# Patient Record
Sex: FEMALE | Race: WHITE | NOT HISPANIC OR LATINO
[De-identification: names, ages, dates, MRNs, and addresses within clinical notes are randomized per-mention and may not be internally consistent; named-entity substitution may affect disease eponyms.]

---

## 2018-01-09 ENCOUNTER — TRANSCRIPTION ENCOUNTER (OUTPATIENT)
Age: 73
End: 2018-01-09

## 2018-03-01 ENCOUNTER — TRANSCRIPTION ENCOUNTER (OUTPATIENT)
Age: 73
End: 2018-03-01

## 2018-10-25 ENCOUNTER — APPOINTMENT (OUTPATIENT)
Dept: BREAST CENTER | Facility: CLINIC | Age: 73
End: 2018-10-25
Payer: MEDICARE

## 2018-10-25 VITALS
DIASTOLIC BLOOD PRESSURE: 78 MMHG | HEART RATE: 96 BPM | BODY MASS INDEX: 22.5 KG/M2 | WEIGHT: 127 LBS | SYSTOLIC BLOOD PRESSURE: 158 MMHG | HEIGHT: 63 IN

## 2018-10-25 DIAGNOSIS — Z80.7 FAMILY HISTORY OF OTHER MALIGNANT NEOPLASMS OF LYMPHOID, HEMATOPOIETIC AND RELATED TISSUES: ICD-10-CM

## 2018-10-25 DIAGNOSIS — Z86.39 PERSONAL HISTORY OF OTHER ENDOCRINE, NUTRITIONAL AND METABOLIC DISEASE: ICD-10-CM

## 2018-10-25 DIAGNOSIS — Z78.9 OTHER SPECIFIED HEALTH STATUS: ICD-10-CM

## 2018-10-25 DIAGNOSIS — Z82.49 FAMILY HISTORY OF ISCHEMIC HEART DISEASE AND OTHER DISEASES OF THE CIRCULATORY SYSTEM: ICD-10-CM

## 2018-10-25 PROCEDURE — 99214 OFFICE O/P EST MOD 30 MIN: CPT

## 2018-10-25 RX ORDER — GINGER ROOT/GINGER ROOT EXT 262.5 MG
CAPSULE ORAL
Refills: 0 | Status: ACTIVE | COMMUNITY

## 2018-10-26 ENCOUNTER — OUTPATIENT (OUTPATIENT)
Dept: OUTPATIENT SERVICES | Facility: HOSPITAL | Age: 73
LOS: 1 days | End: 2018-10-26

## 2018-10-26 ENCOUNTER — APPOINTMENT (OUTPATIENT)
Dept: OPHTHALMOLOGY | Facility: CLINIC | Age: 73
End: 2018-10-26

## 2018-10-26 DIAGNOSIS — H26.9 UNSPECIFIED CATARACT: ICD-10-CM

## 2018-11-01 ENCOUNTER — OUTPATIENT (OUTPATIENT)
Dept: OUTPATIENT SERVICES | Facility: HOSPITAL | Age: 73
LOS: 1 days | Discharge: HOME | End: 2018-11-01

## 2018-11-15 ENCOUNTER — OUTPATIENT (OUTPATIENT)
Dept: OUTPATIENT SERVICES | Facility: HOSPITAL | Age: 73
LOS: 1 days | Discharge: HOME | End: 2018-11-15

## 2019-04-08 ENCOUNTER — TRANSCRIPTION ENCOUNTER (OUTPATIENT)
Age: 74
End: 2019-04-08

## 2019-05-02 ENCOUNTER — APPOINTMENT (OUTPATIENT)
Dept: BREAST CENTER | Facility: CLINIC | Age: 74
End: 2019-05-02
Payer: MEDICARE

## 2019-05-02 VITALS
SYSTOLIC BLOOD PRESSURE: 144 MMHG | HEART RATE: 92 BPM | BODY MASS INDEX: 23.19 KG/M2 | DIASTOLIC BLOOD PRESSURE: 71 MMHG | HEIGHT: 62 IN | WEIGHT: 126 LBS

## 2019-05-02 DIAGNOSIS — Z86.79 PERSONAL HISTORY OF OTHER DISEASES OF THE CIRCULATORY SYSTEM: ICD-10-CM

## 2019-05-02 PROCEDURE — 99214 OFFICE O/P EST MOD 30 MIN: CPT

## 2019-05-02 NOTE — HISTORY OF PRESENT ILLNESS
[FreeTextEntry1] : S/P R Sono Core Bx ( R 10:00 N4)(10/30/17): +complex FA\par +FH Br Ca ( Mother 52, M. Aunts 51, 83, M. FC 58 (BRCA-))\par BRCA (9/07)([facility]): -\par S/P R Br Bx (5/90, [place]): Benign\par S/P Cataracts OU (11/18) > went well\par No other MH/FH changes. ROS reviewed/discussed. Taking Vit D. Last Bone Densitometry (2018): +osteopenia\par R F/U Sono (4/9/18): NSF\par Mammo/Sono (10/24/18): NSF

## 2019-05-02 NOTE — PHYSICAL EXAM
[Atraumatic] : atraumatic [Normocephalic] : normocephalic [No Supraclavicular Adenopathy] : no supraclavicular adenopathy [Supple] : supple [No Cervical Adenopathy] : no cervical adenopathy [No Thyromegaly] : no thyromegaly [Normal Sinus Rhythm] : normal sinus rhythm [No dominant masses] : no dominant masses in right breast  [No dominant masses] : no dominant masses left breast [Examined in the supine and seated position] : examined in the supine and seated position [No Nipple Retraction] : no right nipple retraction [No Axillary Lymphadenopathy] : no right axillary lymphadenopathy [No Nipple Discharge] : no left nipple discharge [No Edema] : no edema [No Rashes] : no rashes [No Ulceration] : no ulceration

## 2019-05-17 ENCOUNTER — TRANSCRIPTION ENCOUNTER (OUTPATIENT)
Age: 74
End: 2019-05-17

## 2020-03-05 ENCOUNTER — APPOINTMENT (OUTPATIENT)
Dept: BREAST CENTER | Facility: CLINIC | Age: 75
End: 2020-03-05
Payer: MEDICARE

## 2020-03-05 VITALS
BODY MASS INDEX: 22.63 KG/M2 | WEIGHT: 123 LBS | SYSTOLIC BLOOD PRESSURE: 155 MMHG | HEART RATE: 98 BPM | DIASTOLIC BLOOD PRESSURE: 77 MMHG | HEIGHT: 62 IN

## 2020-03-05 DIAGNOSIS — Z80.0 FAMILY HISTORY OF MALIGNANT NEOPLASM OF DIGESTIVE ORGANS: ICD-10-CM

## 2020-03-05 PROCEDURE — 99214 OFFICE O/P EST MOD 30 MIN: CPT

## 2020-03-05 NOTE — HISTORY OF PRESENT ILLNESS
[FreeTextEntry1] : S/P R Sono Core Bx ( R 10:00 N4)(10/30/17): +complex FA\par +FH Br Ca ( Mother 52, M. Aunts 51, 83, M. 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\par No other MH/FH changes. ROS reviewed/discussed. Taking Vit D. Last Bone Densitometry (2018): +osteopenia\par Mammo/Sono (10/29/19): PEG, NSF

## 2020-11-14 ENCOUNTER — TRANSCRIPTION ENCOUNTER (OUTPATIENT)
Age: 75
End: 2020-11-14

## 2020-11-19 ENCOUNTER — APPOINTMENT (OUTPATIENT)
Dept: BREAST CENTER | Facility: CLINIC | Age: 75
End: 2020-11-19
Payer: MEDICARE

## 2020-11-19 VITALS
HEART RATE: 93 BPM | DIASTOLIC BLOOD PRESSURE: 85 MMHG | HEIGHT: 62 IN | BODY MASS INDEX: 23 KG/M2 | WEIGHT: 125 LBS | SYSTOLIC BLOOD PRESSURE: 155 MMHG

## 2020-11-19 PROCEDURE — 99214 OFFICE O/P EST MOD 30 MIN: CPT

## 2020-11-19 NOTE — HISTORY OF PRESENT ILLNESS
[FreeTextEntry1] : S/P R Sono Core Bx ( R 10:00 N4)(10/30/17): +complex FA\par +FH Br Ca ( Mother 52, M. Aunts 51, 83, M. FC 58 (BRCA-))\par BRCA ()([facility]): -\par S/P R Br Bx (, [place]): Benign\par S/P Cataracts OU () > went well\par  w/ met Gastric Ca and BRCA1+ >  ()(76yo)\par Sons being tested\par No other MH/FH changes. ROS reviewed/discussed. Taking Vit D. Last Bone Densitometry (): +osteopenia\par Mammo/Sono (20): HD, NSF

## 2020-11-19 NOTE — PHYSICAL EXAM
[Normocephalic] : normocephalic [Atraumatic] : atraumatic [Supple] : supple [No Supraclavicular Adenopathy] : no supraclavicular adenopathy [No Cervical Adenopathy] : no cervical adenopathy [No Thyromegaly] : no thyromegaly [Normal Sinus Rhythm] : normal sinus rhythm [Examined in the supine and seated position] : examined in the supine and seated position [No dominant masses] : no dominant masses in right breast  [No dominant masses] : no dominant masses left breast [No Nipple Retraction] : no left nipple retraction [No Nipple Discharge] : no left nipple discharge [No Axillary Lymphadenopathy] : no left axillary lymphadenopathy [No Edema] : no edema [No Rashes] : no rashes [No Ulceration] : no ulceration [de-identified] : +2-3mm nod R 10:00 N6 > observe

## 2021-05-27 ENCOUNTER — APPOINTMENT (OUTPATIENT)
Dept: BREAST CENTER | Facility: CLINIC | Age: 76
End: 2021-05-27
Payer: MEDICARE

## 2021-05-27 ENCOUNTER — NON-APPOINTMENT (OUTPATIENT)
Age: 76
End: 2021-05-27

## 2021-05-27 VITALS
WEIGHT: 128 LBS | DIASTOLIC BLOOD PRESSURE: 75 MMHG | SYSTOLIC BLOOD PRESSURE: 125 MMHG | HEIGHT: 62 IN | BODY MASS INDEX: 23.55 KG/M2 | HEART RATE: 115 BPM

## 2021-05-27 DIAGNOSIS — Z12.31 ENCOUNTER FOR SCREENING MAMMOGRAM FOR MALIGNANT NEOPLASM OF BREAST: ICD-10-CM

## 2021-05-27 PROCEDURE — 99214 OFFICE O/P EST MOD 30 MIN: CPT

## 2021-05-27 NOTE — HISTORY OF PRESENT ILLNESS
[FreeTextEntry1] : S/P R Sono Core Bx ( R 10:00 N4)(10/30/17): +complex FA\par +FH Br Ca ( Mother 52, M. Aunts 51, 83, M. FC 58 (BRCA-))\par BRCA ()([facility]): -\par S/P R Br Bx (, [place]): Benign\par S/P Cataracts OU () > went well\par  w/ met Gastric Ca and BRCA1+ >  ()(76yo)\par Younger BRCA tested > "-"\par Got second Pfizer (3/1/21)(LUE)\par No other MH/FH changes. ROS reviewed/discussed. Taking Vit D. Last Bone Densitometry (): +osteopenia\par Mammo/Sono (20): HD, NSF

## 2021-12-08 ENCOUNTER — APPOINTMENT (OUTPATIENT)
Dept: BREAST CENTER | Facility: CLINIC | Age: 76
End: 2021-12-08
Payer: MEDICARE

## 2021-12-08 VITALS
DIASTOLIC BLOOD PRESSURE: 77 MMHG | HEIGHT: 62 IN | WEIGHT: 129 LBS | BODY MASS INDEX: 23.74 KG/M2 | SYSTOLIC BLOOD PRESSURE: 148 MMHG

## 2021-12-08 PROCEDURE — 99213 OFFICE O/P EST LOW 20 MIN: CPT

## 2021-12-08 NOTE — HISTORY OF PRESENT ILLNESS
[FreeTextEntry1] : S/P R Sono Core Bx ( R 10:00 N4)(10/30/17): +complex FA\par +FH Br Ca ( Mother 52, M. Aunts 51, 83, M. FC 58 (BRCA-))\par BRCA ()(Multi Site): -\par S/P R Br Bx (, [place]): Benign\par S/P Cataracts OU () > went well\par  w/ met Gastric Ca and BRCA1+ >  ()(76yo)\par One Son is BRCA1+, one is BRCA1-\par Got Pfizer booster (21)(LUE)\par No other MH/FH changes. ROS reviewed/discussed. Taking Vit D. Last Bone Densitometry (): +osteopenia\par Mammo/Sono (21): FG, NSF

## 2022-04-04 ENCOUNTER — TRANSCRIPTION ENCOUNTER (OUTPATIENT)
Age: 77
End: 2022-04-04

## 2022-06-09 ENCOUNTER — APPOINTMENT (OUTPATIENT)
Dept: BREAST CENTER | Facility: CLINIC | Age: 77
End: 2022-06-09
Payer: MEDICARE

## 2022-06-09 VITALS
BODY MASS INDEX: 23.37 KG/M2 | HEIGHT: 62 IN | HEART RATE: 87 BPM | DIASTOLIC BLOOD PRESSURE: 64 MMHG | WEIGHT: 127 LBS | SYSTOLIC BLOOD PRESSURE: 124 MMHG

## 2022-06-09 PROCEDURE — 99213 OFFICE O/P EST LOW 20 MIN: CPT

## 2022-06-09 NOTE — HISTORY OF PRESENT ILLNESS
[FreeTextEntry1] : S/P R Sono Core Bx ( R 10:00 N4)(10/30/17): +complex FA\par +FH Br Ca ( Mother 52, M. Aunts 51, 83, M. FC 58 (BRCA-))\par BRCA ()(Multi Site): -\par S/P R Br Bx (, [place]): Benign\par S/P Cataracts OU () > went well\par  w/ met Gastric Ca and BRCA1+ >  ()(74yo)\par One Son is BRCA1+, one is BRCA1-\par Got second Pfizer booster ()(LUE)\par No other MH/FH changes. ROS reviewed/discussed. Taking Vit D. Last Bone Densitometry (): +osteopenia\par Mammo/Sono (21): FG, NSF

## 2022-06-09 NOTE — PHYSICAL EXAM
[Normocephalic] : normocephalic [Atraumatic] : atraumatic [Supple] : supple [No Supraclavicular Adenopathy] : no supraclavicular adenopathy [No Cervical Adenopathy] : no cervical adenopathy [No Thyromegaly] : no thyromegaly [Normal Sinus Rhythm] : normal sinus rhythm [Examined in the supine and seated position] : examined in the supine and seated position [No dominant masses] : no dominant masses in right breast  [No dominant masses] : no dominant masses left breast [No Nipple Retraction] : no left nipple retraction [No Nipple Discharge] : no left nipple discharge [No Axillary Lymphadenopathy] : no left axillary lymphadenopathy [No Edema] : no edema [No Rashes] : no rashes [No Ulceration] : no ulceration [de-identified] : +focal nod th R 5:00 N4 > observe

## 2022-07-25 ENCOUNTER — NON-APPOINTMENT (OUTPATIENT)
Age: 77
End: 2022-07-25

## 2022-08-27 NOTE — REVIEW OF SYSTEMS
Care Management Follow Up    Length of Stay (days): 2    Expected Discharge Date: 08/28/2022     Concerns to be Addressed: all concerns addressed in this encounter     Patient plan of care discussed at interdisciplinary rounds: Yes    Anticipated Discharge Disposition: Home, Home Care     Anticipated Discharge Services: None  Anticipated Discharge DME: None    Referrals Placed by CM/SW: External Care Coordination, Homecare  Private pay costs discussed: Not applicable    Additional Information:  Care management following for discharge planning. Recommendations for home with home care.   Spoke with nursing at [facility], [phone], to verify services. Patient was only receiving safety checks. Patient was independent.     Anticipate return to [facility] with home care RN/PT/OT through [facility]. P: [phone] Fax [phone]    Update 1430: Spoke with patient at bedside to review recommendations. Patient agreeable to returning to [facility] with Home Care. Patient states will not require assistance upon discharge. Patient was SBA/CGA with bed mobility, transfers, and ambulated 200 feet. Will clarify recommendations for assistance with therapy with next planned session.  Patient states will not have ride availability until Monday as family is out of town. Care management will discuss transportation options with patient further once discharge ready.     Update 1520: Per provider discussion, TCU is preferred as patient does not have assistance with mobility at [facility].  Referral sent to [facility].       Sussy Samuel, RN      Sussy Samuel RN Case Manager  Inpatient Care Coordination  [facility]   [phone]   [Negative] : Heme/Lymph

## 2022-12-21 ENCOUNTER — APPOINTMENT (OUTPATIENT)
Dept: BREAST CENTER | Facility: CLINIC | Age: 77
End: 2022-12-21

## 2022-12-21 VITALS
HEART RATE: 79 BPM | BODY MASS INDEX: 23.37 KG/M2 | SYSTOLIC BLOOD PRESSURE: 138 MMHG | WEIGHT: 127 LBS | HEIGHT: 62 IN | DIASTOLIC BLOOD PRESSURE: 80 MMHG

## 2022-12-21 PROCEDURE — 99214 OFFICE O/P EST MOD 30 MIN: CPT

## 2022-12-21 RX ORDER — ESOMEPRAZOLE MAGNESIUM 20 MG/1
TABLET, DELAYED RELEASE ORAL
Refills: 0 | Status: ACTIVE | COMMUNITY

## 2022-12-21 RX ORDER — ADHESIVE TAPE 3"X 2.3 YD
50 MCG TAPE, NON-MEDICATED TOPICAL
Refills: 0 | Status: ACTIVE | COMMUNITY

## 2022-12-21 RX ORDER — ATORVASTATIN CALCIUM 20 MG/1
20 TABLET, FILM COATED ORAL
Refills: 0 | Status: ACTIVE | COMMUNITY

## 2022-12-21 RX ORDER — AMLODIPINE BESYLATE 5 MG/1
5 TABLET ORAL
Refills: 0 | Status: ACTIVE | COMMUNITY

## 2022-12-21 RX ORDER — METOPROLOL TARTRATE 25 MG/1
25 TABLET, FILM COATED ORAL
Refills: 0 | Status: ACTIVE | COMMUNITY

## 2022-12-21 RX ORDER — LOSARTAN POTASSIUM 100 MG/1
100 TABLET, FILM COATED ORAL
Refills: 0 | Status: ACTIVE | COMMUNITY

## 2022-12-21 NOTE — PHYSICAL EXAM
[Normocephalic] : normocephalic [Atraumatic] : atraumatic [Supple] : supple [No Supraclavicular Adenopathy] : no supraclavicular adenopathy [No Cervical Adenopathy] : no cervical adenopathy [Normal Sinus Rhythm] : normal sinus rhythm [Examined in the supine and seated position] : examined in the supine and seated position [No dominant masses] : no dominant masses in right breast  [No dominant masses] : no dominant masses left breast [No Nipple Retraction] : no left nipple retraction [No Nipple Discharge] : no left nipple discharge [No Axillary Lymphadenopathy] : no left axillary lymphadenopathy [No Edema] : no edema [No Rashes] : no rashes [No Ulceration] : no ulceration [de-identified] : +mild thyromegaly [de-identified] : +dense FC tissue\par NSF  [de-identified] : +dense FC tissue\par NSF

## 2022-12-21 NOTE — HISTORY OF PRESENT ILLNESS
[FreeTextEntry1] : S/P R Sono Core Bx ( R 10:00 N4)(10/30/17): +complex FA\par +FH Br Ca ( Mother 52, M. Aunts 51, 83, M. FC 58 (BRCA-))\par BRCA ()(Multi Site): -\par S/P R Br Bx (, [place]): Benign\par S/P Cataracts OU () > went well\par  w/ met Gastric Ca and BRCA1+ >  ()(76yo)\par One Son is BRCA1+, one is BRCA1-\par Pt seeing Endocr for poss hyperthyroid and nodules > S/P Thyroid FNA (10/22): "Benign"\par Seeing Cardiol for AR/SD > HT Rx adjusted > followed\par Got second Pfizer booster ()(LUE)\par No other MH/FH changes. ROS reviewed/discussed. Taking Vit D. Last Bone Densitometry (): +osteopenia\par Mammo/Sono (22): FG, NSF

## 2023-06-07 ENCOUNTER — APPOINTMENT (OUTPATIENT)
Dept: BREAST CENTER | Facility: CLINIC | Age: 78
End: 2023-06-07
Payer: MEDICARE

## 2023-06-07 VITALS
WEIGHT: 125 LBS | SYSTOLIC BLOOD PRESSURE: 146 MMHG | HEART RATE: 78 BPM | DIASTOLIC BLOOD PRESSURE: 73 MMHG | HEIGHT: 62 IN | BODY MASS INDEX: 23 KG/M2

## 2023-06-07 PROCEDURE — 99214 OFFICE O/P EST MOD 30 MIN: CPT

## 2023-06-07 NOTE — HISTORY OF PRESENT ILLNESS
[FreeTextEntry1] : S/P R Sono Core Bx ( R 10:00 N4)(10/30/17): +complex FA\par +FH Br Ca ( Mother 52, M. Aunts 51, 83, M. FC 58 (BRCA-))\par BRCA ()(Multi Site): -\par S/P R Br Bx (, [place]): Benign\par S/P DEVORA/BSO (): Benign\par S/P Cataracts OU () > went well\par  w/ met Gastric Ca and BRCA1+ >  ()(76yo)\par One Son is BRCA1+, one is BRCA1-\par Pt seeing Endocr for poss hyperthyroid and nodules > S/P Thyroid FNA (10/22): "Benign"\par Seeing Cardiol for AR/NV > HT Rx adjusted > followed\par Colonoscopy(): "+polyp" > 5yrs \par Got second Pfizer booster ()([place])\par No other MH/FH changes. ROS reviewed/discussed. Taking Vit D. Last Bone Densitometry (): +osteopenia\par Mammo/Sono (22): FG, NSF

## 2023-06-07 NOTE — REVIEW OF SYSTEMS
[Chest Pain] : chest pain [Diarrhea] : diarrhea [Heartburn] : heartburn [Arthralgias] : arthralgias [Negative] : Heme/Lymph

## 2023-06-07 NOTE — PHYSICAL EXAM
[Normocephalic] : normocephalic [Atraumatic] : atraumatic [Supple] : supple [No Supraclavicular Adenopathy] : no supraclavicular adenopathy [No Cervical Adenopathy] : no cervical adenopathy [Normal Sinus Rhythm] : normal sinus rhythm [Examined in the supine and seated position] : examined in the supine and seated position [No dominant masses] : no dominant masses in right breast  [No dominant masses] : no dominant masses left breast [No Nipple Retraction] : no left nipple retraction [No Nipple Discharge] : no left nipple discharge [No Axillary Lymphadenopathy] : no left axillary lymphadenopathy [No Edema] : no edema [No Rashes] : no rashes [No Ulceration] : no ulceration [de-identified] : +thyromegaly > followed [de-identified] : +dense FC tissue\par NSF  [de-identified] : +dense FC tissue\par NSF

## 2023-12-20 ENCOUNTER — INPATIENT (INPATIENT)
Facility: HOSPITAL | Age: 78
LOS: 0 days | Discharge: HOME | DRG: 305 | End: 2023-12-21
Attending: STUDENT IN AN ORGANIZED HEALTH CARE EDUCATION/TRAINING PROGRAM | Admitting: STUDENT IN AN ORGANIZED HEALTH CARE EDUCATION/TRAINING PROGRAM
Payer: COMMERCIAL

## 2023-12-20 VITALS
OXYGEN SATURATION: 99 % | RESPIRATION RATE: 19 BRPM | TEMPERATURE: 98 F | DIASTOLIC BLOOD PRESSURE: 89 MMHG | HEIGHT: 62.6 IN | HEART RATE: 80 BPM | WEIGHT: 125.66 LBS | SYSTOLIC BLOOD PRESSURE: 156 MMHG

## 2023-12-20 DIAGNOSIS — I16.1 HYPERTENSIVE EMERGENCY: ICD-10-CM

## 2023-12-20 DIAGNOSIS — I24.9 ACUTE ISCHEMIC HEART DISEASE, UNSPECIFIED: ICD-10-CM

## 2023-12-20 DIAGNOSIS — E05.90 THYROTOXICOSIS, UNSPECIFIED WITHOUT THYROTOXIC CRISIS OR STORM: ICD-10-CM

## 2023-12-20 DIAGNOSIS — E78.5 HYPERLIPIDEMIA, UNSPECIFIED: ICD-10-CM

## 2023-12-20 DIAGNOSIS — Z29.9 ENCOUNTER FOR PROPHYLACTIC MEASURES, UNSPECIFIED: ICD-10-CM

## 2023-12-20 DIAGNOSIS — I10 ESSENTIAL (PRIMARY) HYPERTENSION: ICD-10-CM

## 2023-12-20 DIAGNOSIS — R07.89 OTHER CHEST PAIN: ICD-10-CM

## 2023-12-20 LAB
A1C WITH ESTIMATED AVERAGE GLUCOSE RESULT: 5.8 % — HIGH (ref 4–5.6)
A1C WITH ESTIMATED AVERAGE GLUCOSE RESULT: 5.8 % — HIGH (ref 4–5.6)
ALBUMIN SERPL ELPH-MCNC: 3.6 G/DL — SIGNIFICANT CHANGE UP (ref 3.5–5)
ALBUMIN SERPL ELPH-MCNC: 3.6 G/DL — SIGNIFICANT CHANGE UP (ref 3.5–5)
ALP SERPL-CCNC: 67 U/L — SIGNIFICANT CHANGE UP (ref 40–120)
ALP SERPL-CCNC: 67 U/L — SIGNIFICANT CHANGE UP (ref 40–120)
ALT FLD-CCNC: 24 U/L DA — SIGNIFICANT CHANGE UP (ref 10–60)
ALT FLD-CCNC: 24 U/L DA — SIGNIFICANT CHANGE UP (ref 10–60)
ANION GAP SERPL CALC-SCNC: 6 MMOL/L — SIGNIFICANT CHANGE UP (ref 5–17)
ANION GAP SERPL CALC-SCNC: 6 MMOL/L — SIGNIFICANT CHANGE UP (ref 5–17)
APTT BLD: 27.9 SEC — SIGNIFICANT CHANGE UP (ref 24.5–35.6)
APTT BLD: 27.9 SEC — SIGNIFICANT CHANGE UP (ref 24.5–35.6)
AST SERPL-CCNC: 17 U/L — SIGNIFICANT CHANGE UP (ref 10–40)
AST SERPL-CCNC: 17 U/L — SIGNIFICANT CHANGE UP (ref 10–40)
BASOPHILS # BLD AUTO: 0.03 K/UL — SIGNIFICANT CHANGE UP (ref 0–0.2)
BASOPHILS # BLD AUTO: 0.03 K/UL — SIGNIFICANT CHANGE UP (ref 0–0.2)
BASOPHILS NFR BLD AUTO: 0.5 % — SIGNIFICANT CHANGE UP (ref 0–2)
BASOPHILS NFR BLD AUTO: 0.5 % — SIGNIFICANT CHANGE UP (ref 0–2)
BILIRUB SERPL-MCNC: 0.7 MG/DL — SIGNIFICANT CHANGE UP (ref 0.2–1.2)
BILIRUB SERPL-MCNC: 0.7 MG/DL — SIGNIFICANT CHANGE UP (ref 0.2–1.2)
BUN SERPL-MCNC: 20 MG/DL — HIGH (ref 7–18)
BUN SERPL-MCNC: 20 MG/DL — HIGH (ref 7–18)
CALCIUM SERPL-MCNC: 9.2 MG/DL — SIGNIFICANT CHANGE UP (ref 8.4–10.5)
CALCIUM SERPL-MCNC: 9.2 MG/DL — SIGNIFICANT CHANGE UP (ref 8.4–10.5)
CHLORIDE SERPL-SCNC: 106 MMOL/L — SIGNIFICANT CHANGE UP (ref 96–108)
CHLORIDE SERPL-SCNC: 106 MMOL/L — SIGNIFICANT CHANGE UP (ref 96–108)
CHOLEST SERPL-MCNC: 157 MG/DL — SIGNIFICANT CHANGE UP
CHOLEST SERPL-MCNC: 157 MG/DL — SIGNIFICANT CHANGE UP
CO2 SERPL-SCNC: 28 MMOL/L — SIGNIFICANT CHANGE UP (ref 22–31)
CO2 SERPL-SCNC: 28 MMOL/L — SIGNIFICANT CHANGE UP (ref 22–31)
CREAT SERPL-MCNC: 0.98 MG/DL — SIGNIFICANT CHANGE UP (ref 0.5–1.3)
CREAT SERPL-MCNC: 0.98 MG/DL — SIGNIFICANT CHANGE UP (ref 0.5–1.3)
EGFR: 60 ML/MIN/1.73M2 — SIGNIFICANT CHANGE UP
EGFR: 60 ML/MIN/1.73M2 — SIGNIFICANT CHANGE UP
EOSINOPHIL # BLD AUTO: 0.09 K/UL — SIGNIFICANT CHANGE UP (ref 0–0.5)
EOSINOPHIL # BLD AUTO: 0.09 K/UL — SIGNIFICANT CHANGE UP (ref 0–0.5)
EOSINOPHIL NFR BLD AUTO: 1.5 % — SIGNIFICANT CHANGE UP (ref 0–6)
EOSINOPHIL NFR BLD AUTO: 1.5 % — SIGNIFICANT CHANGE UP (ref 0–6)
ESTIMATED AVERAGE GLUCOSE: 120 MG/DL — HIGH (ref 68–114)
ESTIMATED AVERAGE GLUCOSE: 120 MG/DL — HIGH (ref 68–114)
GLUCOSE SERPL-MCNC: 102 MG/DL — HIGH (ref 70–99)
GLUCOSE SERPL-MCNC: 102 MG/DL — HIGH (ref 70–99)
HCT VFR BLD CALC: 38.2 % — SIGNIFICANT CHANGE UP (ref 34.5–45)
HCT VFR BLD CALC: 38.2 % — SIGNIFICANT CHANGE UP (ref 34.5–45)
HDLC SERPL-MCNC: 77 MG/DL — SIGNIFICANT CHANGE UP
HDLC SERPL-MCNC: 77 MG/DL — SIGNIFICANT CHANGE UP
HGB BLD-MCNC: 12.1 G/DL — SIGNIFICANT CHANGE UP (ref 11.5–15.5)
HGB BLD-MCNC: 12.1 G/DL — SIGNIFICANT CHANGE UP (ref 11.5–15.5)
IMM GRANULOCYTES NFR BLD AUTO: 0.3 % — SIGNIFICANT CHANGE UP (ref 0–0.9)
IMM GRANULOCYTES NFR BLD AUTO: 0.3 % — SIGNIFICANT CHANGE UP (ref 0–0.9)
INR BLD: 1.01 RATIO — SIGNIFICANT CHANGE UP (ref 0.85–1.18)
INR BLD: 1.01 RATIO — SIGNIFICANT CHANGE UP (ref 0.85–1.18)
LIDOCAIN IGE QN: 97 U/L — HIGH (ref 13–75)
LIDOCAIN IGE QN: 97 U/L — HIGH (ref 13–75)
LIPID PNL WITH DIRECT LDL SERPL: 67 MG/DL — SIGNIFICANT CHANGE UP
LIPID PNL WITH DIRECT LDL SERPL: 67 MG/DL — SIGNIFICANT CHANGE UP
LYMPHOCYTES # BLD AUTO: 1.24 K/UL — SIGNIFICANT CHANGE UP (ref 1–3.3)
LYMPHOCYTES # BLD AUTO: 1.24 K/UL — SIGNIFICANT CHANGE UP (ref 1–3.3)
LYMPHOCYTES # BLD AUTO: 20.8 % — SIGNIFICANT CHANGE UP (ref 13–44)
LYMPHOCYTES # BLD AUTO: 20.8 % — SIGNIFICANT CHANGE UP (ref 13–44)
MAGNESIUM SERPL-MCNC: 2 MG/DL — SIGNIFICANT CHANGE UP (ref 1.6–2.6)
MAGNESIUM SERPL-MCNC: 2 MG/DL — SIGNIFICANT CHANGE UP (ref 1.6–2.6)
MCHC RBC-ENTMCNC: 27.8 PG — SIGNIFICANT CHANGE UP (ref 27–34)
MCHC RBC-ENTMCNC: 27.8 PG — SIGNIFICANT CHANGE UP (ref 27–34)
MCHC RBC-ENTMCNC: 31.7 GM/DL — LOW (ref 32–36)
MCHC RBC-ENTMCNC: 31.7 GM/DL — LOW (ref 32–36)
MCV RBC AUTO: 87.6 FL — SIGNIFICANT CHANGE UP (ref 80–100)
MCV RBC AUTO: 87.6 FL — SIGNIFICANT CHANGE UP (ref 80–100)
MONOCYTES # BLD AUTO: 0.55 K/UL — SIGNIFICANT CHANGE UP (ref 0–0.9)
MONOCYTES # BLD AUTO: 0.55 K/UL — SIGNIFICANT CHANGE UP (ref 0–0.9)
MONOCYTES NFR BLD AUTO: 9.2 % — SIGNIFICANT CHANGE UP (ref 2–14)
MONOCYTES NFR BLD AUTO: 9.2 % — SIGNIFICANT CHANGE UP (ref 2–14)
NEUTROPHILS # BLD AUTO: 4.03 K/UL — SIGNIFICANT CHANGE UP (ref 1.8–7.4)
NEUTROPHILS # BLD AUTO: 4.03 K/UL — SIGNIFICANT CHANGE UP (ref 1.8–7.4)
NEUTROPHILS NFR BLD AUTO: 67.7 % — SIGNIFICANT CHANGE UP (ref 43–77)
NEUTROPHILS NFR BLD AUTO: 67.7 % — SIGNIFICANT CHANGE UP (ref 43–77)
NON HDL CHOLESTEROL: 80 MG/DL — SIGNIFICANT CHANGE UP
NON HDL CHOLESTEROL: 80 MG/DL — SIGNIFICANT CHANGE UP
NRBC # BLD: 0 /100 WBCS — SIGNIFICANT CHANGE UP (ref 0–0)
NRBC # BLD: 0 /100 WBCS — SIGNIFICANT CHANGE UP (ref 0–0)
NT-PROBNP SERPL-SCNC: 77 PG/ML — SIGNIFICANT CHANGE UP (ref 0–450)
NT-PROBNP SERPL-SCNC: 77 PG/ML — SIGNIFICANT CHANGE UP (ref 0–450)
PLATELET # BLD AUTO: 295 K/UL — SIGNIFICANT CHANGE UP (ref 150–400)
PLATELET # BLD AUTO: 295 K/UL — SIGNIFICANT CHANGE UP (ref 150–400)
POTASSIUM SERPL-MCNC: 4.3 MMOL/L — SIGNIFICANT CHANGE UP (ref 3.5–5.3)
POTASSIUM SERPL-MCNC: 4.3 MMOL/L — SIGNIFICANT CHANGE UP (ref 3.5–5.3)
POTASSIUM SERPL-SCNC: 4.3 MMOL/L — SIGNIFICANT CHANGE UP (ref 3.5–5.3)
POTASSIUM SERPL-SCNC: 4.3 MMOL/L — SIGNIFICANT CHANGE UP (ref 3.5–5.3)
PROT SERPL-MCNC: 7.9 G/DL — SIGNIFICANT CHANGE UP (ref 6–8.3)
PROT SERPL-MCNC: 7.9 G/DL — SIGNIFICANT CHANGE UP (ref 6–8.3)
PROTHROM AB SERPL-ACNC: 11.5 SEC — SIGNIFICANT CHANGE UP (ref 9.5–13)
PROTHROM AB SERPL-ACNC: 11.5 SEC — SIGNIFICANT CHANGE UP (ref 9.5–13)
RBC # BLD: 4.36 M/UL — SIGNIFICANT CHANGE UP (ref 3.8–5.2)
RBC # BLD: 4.36 M/UL — SIGNIFICANT CHANGE UP (ref 3.8–5.2)
RBC # FLD: 12.9 % — SIGNIFICANT CHANGE UP (ref 10.3–14.5)
RBC # FLD: 12.9 % — SIGNIFICANT CHANGE UP (ref 10.3–14.5)
SODIUM SERPL-SCNC: 140 MMOL/L — SIGNIFICANT CHANGE UP (ref 135–145)
SODIUM SERPL-SCNC: 140 MMOL/L — SIGNIFICANT CHANGE UP (ref 135–145)
TRIGL SERPL-MCNC: 66 MG/DL — SIGNIFICANT CHANGE UP
TRIGL SERPL-MCNC: 66 MG/DL — SIGNIFICANT CHANGE UP
TROPONIN I, HIGH SENSITIVITY RESULT: 5.6 NG/L — SIGNIFICANT CHANGE UP
TROPONIN I, HIGH SENSITIVITY RESULT: 5.6 NG/L — SIGNIFICANT CHANGE UP
TROPONIN I, HIGH SENSITIVITY RESULT: 6.1 NG/L — SIGNIFICANT CHANGE UP
TROPONIN I, HIGH SENSITIVITY RESULT: 6.1 NG/L — SIGNIFICANT CHANGE UP
TSH SERPL-MCNC: 0.51 UU/ML — SIGNIFICANT CHANGE UP (ref 0.34–4.82)
TSH SERPL-MCNC: 0.51 UU/ML — SIGNIFICANT CHANGE UP (ref 0.34–4.82)
WBC # BLD: 5.96 K/UL — SIGNIFICANT CHANGE UP (ref 3.8–10.5)
WBC # BLD: 5.96 K/UL — SIGNIFICANT CHANGE UP (ref 3.8–10.5)
WBC # FLD AUTO: 5.96 K/UL — SIGNIFICANT CHANGE UP (ref 3.8–10.5)
WBC # FLD AUTO: 5.96 K/UL — SIGNIFICANT CHANGE UP (ref 3.8–10.5)

## 2023-12-20 PROCEDURE — 71045 X-RAY EXAM CHEST 1 VIEW: CPT | Mod: 26

## 2023-12-20 PROCEDURE — 99285 EMERGENCY DEPT VISIT HI MDM: CPT

## 2023-12-20 PROCEDURE — 99223 1ST HOSP IP/OBS HIGH 75: CPT | Mod: AI

## 2023-12-20 RX ORDER — ATORVASTATIN CALCIUM 80 MG/1
80 TABLET, FILM COATED ORAL AT BEDTIME
Refills: 0 | Status: DISCONTINUED | OUTPATIENT
Start: 2023-12-20 | End: 2023-12-21

## 2023-12-20 RX ORDER — ACETAMINOPHEN 500 MG
650 TABLET ORAL EVERY 6 HOURS
Refills: 0 | Status: DISCONTINUED | OUTPATIENT
Start: 2023-12-20 | End: 2023-12-21

## 2023-12-20 RX ORDER — ASPIRIN/CALCIUM CARB/MAGNESIUM 324 MG
81 TABLET ORAL DAILY
Refills: 0 | Status: DISCONTINUED | OUTPATIENT
Start: 2023-12-21 | End: 2023-12-21

## 2023-12-20 RX ORDER — ENOXAPARIN SODIUM 100 MG/ML
40 INJECTION SUBCUTANEOUS EVERY 24 HOURS
Refills: 0 | Status: DISCONTINUED | OUTPATIENT
Start: 2023-12-20 | End: 2023-12-21

## 2023-12-20 RX ORDER — METOPROLOL TARTRATE 50 MG
12.5 TABLET ORAL
Refills: 0 | Status: DISCONTINUED | OUTPATIENT
Start: 2023-12-20 | End: 2023-12-21

## 2023-12-20 RX ORDER — ATORVASTATIN CALCIUM 80 MG/1
1 TABLET, FILM COATED ORAL
Refills: 0 | DISCHARGE

## 2023-12-20 RX ORDER — LOSARTAN POTASSIUM 100 MG/1
100 TABLET, FILM COATED ORAL DAILY
Refills: 0 | Status: DISCONTINUED | OUTPATIENT
Start: 2023-12-20 | End: 2023-12-21

## 2023-12-20 RX ORDER — LOSARTAN POTASSIUM 100 MG/1
1 TABLET, FILM COATED ORAL
Refills: 0 | DISCHARGE

## 2023-12-20 RX ORDER — METOPROLOL TARTRATE 50 MG
1 TABLET ORAL
Refills: 0 | DISCHARGE

## 2023-12-20 RX ORDER — AMLODIPINE BESYLATE 2.5 MG/1
5 TABLET ORAL DAILY
Refills: 0 | Status: DISCONTINUED | OUTPATIENT
Start: 2023-12-20 | End: 2023-12-21

## 2023-12-20 RX ORDER — AMLODIPINE BESYLATE 2.5 MG/1
1 TABLET ORAL
Refills: 0 | DISCHARGE

## 2023-12-20 RX ORDER — IBANDRONATE SODIUM 150 MG/1
0 TABLET ORAL
Refills: 0 | DISCHARGE

## 2023-12-20 RX ORDER — ASPIRIN/CALCIUM CARB/MAGNESIUM 324 MG
324 TABLET ORAL ONCE
Refills: 0 | Status: COMPLETED | OUTPATIENT
Start: 2023-12-20 | End: 2023-12-20

## 2023-12-20 RX ADMIN — Medication 650 MILLIGRAM(S): at 18:41

## 2023-12-20 RX ADMIN — ATORVASTATIN CALCIUM 80 MILLIGRAM(S): 80 TABLET, FILM COATED ORAL at 22:47

## 2023-12-20 RX ADMIN — Medication 650 MILLIGRAM(S): at 19:18

## 2023-12-20 RX ADMIN — Medication 324 MILLIGRAM(S): at 12:15

## 2023-12-20 RX ADMIN — Medication 12.5 MILLIGRAM(S): at 17:29

## 2023-12-20 RX ADMIN — LOSARTAN POTASSIUM 100 MILLIGRAM(S): 100 TABLET, FILM COATED ORAL at 14:05

## 2023-12-20 RX ADMIN — ENOXAPARIN SODIUM 40 MILLIGRAM(S): 100 INJECTION SUBCUTANEOUS at 14:08

## 2023-12-20 RX ADMIN — AMLODIPINE BESYLATE 5 MILLIGRAM(S): 2.5 TABLET ORAL at 14:08

## 2023-12-20 NOTE — H&P ADULT - NSHPPHYSICALEXAM_GEN_ALL_CORE
VITALS:     GENERAL: NAD, lying in bed comfortably  HEAD:  Atraumatic, Normocephalic  EYES: EOMI, PERRLA, conjunctiva and sclera clear  ENT: Moist mucous membranes  NECK: Supple, No JVD  CHEST/LUNG: Clear to auscultation bilaterally; No rales, rhonchi, wheezing, or rubs. Unlabored respirations  HEART: Regular rate and rhythm; No murmurs, rubs, or gallops  ABDOMEN: Bowel sounds present; Soft, Nontender, Nondistended. No hepatomegally  EXTREMITIES:  2+ Peripheral Pulses, brisk capillary refill. No clubbing, cyanosis, or edema  NERVOUS SYSTEM:  Alert & Oriented X3, speech clear. No deficits   MSK: FROM all 4 extremities, full and equal strength  SKIN: No rashes or lesions GENERAL: NAD, lying in bed comfortably, avoids eye contact, thin built   HEAD:  Atraumatic, Normocephalic  EYES: conjunctiva and sclera clear, Lt eye squint,   ENT: Moist mucous membranes  NECK: Supple, No JVD  CHEST/LUNG: Clear to auscultation bilaterally; No rales, rhonchi, wheezing, or rubs. Unlabored respirations  HEART: Regular rate and rhythm; + systolic murmur at the left lower sternal border, Loud S1, No rubs, or gallops  ABDOMEN: Bowel sounds present; Soft, Nontender, Nondistended.   EXTREMITIES:  2+ Peripheral Pulses, brisk capillary refill. No clubbing, cyanosis, or edema  NERVOUS SYSTEM:  Alert & Oriented X3, speech clear. 5/5 UE and LE strength, sensation intact in all extremities, + tremors bilaterally with finger to nose test, normal alternating movement of the hand, normal heel to shin test, no pronator drift, no facial droop, CN II-XII normal except for lt eye squint  MSK: FROM all 4 extremities, full and equal strength  SKIN: No rashes or lesions

## 2023-12-20 NOTE — H&P ADULT - PROBLEM SELECTOR PLAN 4
hx of HLD on Atorvastatin 20 mg   c/w Atorvastatin 80 mg  f/u lipid panel - adjust to home dose if lipid panel normal

## 2023-12-20 NOTE — ED ADULT NURSE NOTE - NSICDXPASTMEDICALHX_GEN_ALL_CORE_FT
PAST MEDICAL HISTORY:  H/O hyperthyroidism     High cholesterol     Hypertension     Osteopenia

## 2023-12-20 NOTE — ED PROVIDER NOTE - OBJECTIVE STATEMENT
78-year-old female history of hypertension, hyperlipidemia, thyroid disease presenting with palpitations and shortness of breath with dizziness onset while sleeping around 1 hour prior to arrival.  Symptoms lasted for "about a minute" and self resolved by the time EMS came.  Currently denies any symptoms.  States she has never felt symptoms as bad.  Currently complains of mild headache which is resolving on its own.  Denies having chest pain, leg pain or swelling.  Denies any history of ACS.  States she had a normal stress test about a year ago.  States her cardiologist is at [place]. 78-year-old female history of hypertension, hyperlipidemia, thyroid disease presenting with palpitations and shortness of breath with dizziness onset while sleeping around 1 hour prior to arrival.  Symptoms lasted for "about a minute" and self resolved by the time EMS came.  Currently denies any symptoms.  States she has never felt symptoms as bad.  Currently complains of mild headache which is resolving on its own.  Denies having chest pain, leg pain or swelling.  Denies any history of ACS.  States she had a normal stress test about a year ago.  States her cardiologist is at [place]. 78-year-old female history of hypertension, hyperlipidemia, thyroid disease presenting with sl chest pressure, palpitations and shortness of breath with dizziness onset while sleeping around 1 hour prior to arrival.  Symptoms lasted for "about a minute" and self resolved by the time EMS came.  Currently denies any symptoms.  States she has never felt symptoms as bad.  Currently complains of mild headache which is resolving on its own.  Denies having chest pain, leg pain or swelling.  Denies any history of ACS.  States she had a normal stress test about a year ago.  States her cardiologist is at [place]. 78-year-old female history of hypertension, hyperlipidemia, thyroid disease presenting with sl chest pressure, palpitations and shortness of breath with dizziness onset while sleeping around 1 hour prior to arrival.  Symptoms lasted for "about a minute" and self resolved by the time EMS came.  Currently denies any symptoms.  States she has never felt symptoms as bad.  Currently complains of mild headache which is resolving on its own.  Denies having chest pain, leg pain or swelling.  Denies any history of ACS.  States she had a normal stress test about a year ago.  States her cardiologist is at [place].

## 2023-12-20 NOTE — H&P ADULT - NSHPSOURCEINFORD_GEN_ALL_CORE
You can access the [facility] Patient Portal, offered by [facility], by registering with the following website: [email or web address]

## 2023-12-20 NOTE — PATIENT PROFILE ADULT - FALL HARM RISK - UNIVERSAL INTERVENTIONS
Bed in lowest position, wheels locked, appropriate side rails in place/Call bell, personal items and telephone in reach/Instruct patient to call for assistance before getting out of bed or chair/Non-slip footwear when patient is out of bed/[place] to call system/Physically safe environment - no spills, clutter or unnecessary equipment/Purposeful Proactive Rounding/Room/bathroom lighting operational, light cord in reach Bed in lowest position, wheels locked, appropriate side rails in place/Call bell, personal items and telephone in reach/Instruct patient to call for assistance before getting out of bed or chair/Non-slip footwear when patient is out of bed/[place] to call system/Physically safe environment - no spills, clutter or unnecessary equipment/Purposeful Proactive Rounding/Room/bathroom lighting operational, light cord in reach

## 2023-12-20 NOTE — ED PROVIDER NOTE - CLINICAL SUMMARY MEDICAL DECISION MAKING FREE TEXT BOX
78-year-old female presenting with episode of palpitations, shortness of breath and dizziness.  Concern for ACS, arrhythmia, anxiety, worsening thyroid disease among other causes.  No obvious signs to suggest pulmonary embolism.  Plan to perform EKG, chest x-ray, cardiac enzymes and reassess.

## 2023-12-20 NOTE — H&P ADULT - PROBLEM SELECTOR PLAN 3
hx of hyperthyroidism on Methimazole 5 mg M/W/F  following with Dr. Farzana Cordero at [facility]  c/w home medication   TSH: Normal hx of hyperthyroidism on Methimazole 5 mg M/W/F  following with Dr. Farzana Cordreo at [facility]  c/w home medication   TSH: Normal

## 2023-12-20 NOTE — H&P ADULT - ATTENDING COMMENTS
78F HTN, HLD, hyperthyroidism, p/w cc palpiations, lightheadedness, found with new Q waves on EKG, admission for ACS workup.    AP:  ACS r/o  HTN  hyperthyroidism  HLD    -tele monitoring  -discussed with Dr Ramya hayes  -obtain TTE per freddy recs  -EKG reviewed with new Q waves, trops neg x2  -c/w asa/statin  -resume home meds  -dvt ppx

## 2023-12-20 NOTE — ED PROVIDER NOTE - PROGRESS NOTE DETAILS
Called cardiologist office twice at [place] phone #[phone].  Was able to obtain EKG which appears different from 1 today.  Plan to admit.  Patient aware of plan Called cardiologist office twice at [place] phone #[phone].  Was able to obtain EKG which appears different from 1 today.  Plan to admit.  Patient aware of plan

## 2023-12-20 NOTE — H&P ADULT - HISTORY OF PRESENT ILLNESS
78 yrs old F, from home, ambulating independently, pmhx of HTN, HLD, hyperthyroidism, osteopenia, small bowel obstruction s/p surgical resection, pshx 2 C-sec, and hysterectomy, presented with elevated blood pressure, palpitation and lightheadedness. Pt stated that upon waking up she felt her head was "cottony", headache radiating from the neck upward, lightheadedness upon going to the bathroom, along with palpitation. Pt stated that "she felt she was going to die and that her mind was going to one direction while her body was sitting there", Upon EMS arrival, her blood pressure was elevated to SBP of 180 mmHg. Denied chest pain, blurring of vision, dyspnea, abdominal pain, N/V/D/constipation, unilateral weakness or decreased sensation in the extremities or the face, slurring of speech.   Pt reported her blood pressure being 110-120s at home and compliant to her mediations which includes Amlodipine, Losartan and Metoprolol. She follows with Dr. Perkins Cardiologist in [place] with last Echo and stress test done on 2022 for pre-syncopal episodes from sitting to standing position. Pt no longer has these episodes after increasing her water intake.   She is active on daily basis, walking about 2.5 miles, able to climb one flight of stairs without dyspnea, chest pain, palpitation, or other symptoms.   Pt was diagnosed with hyperthyroidism last year was started on Methimazole 5mg three times weekly [M/W/F], following with Dr. Farzana Armstrong in [facility]. She endorsed having 12 thyroid nodules with three nodules biopsied last year, she is due for biopsy tomorrow.     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   Pt consumes alcohol socially, denied smoking, use marijuana or illicit drugs.  78 yrs old F, from home, ambulating independently, pmhx of HTN, HLD, hyperthyroidism, osteopenia, small bowel obstruction s/p surgical resection, pshx 2 C-sec, and hysterectomy, presented with elevated blood pressure, palpitation and lightheadedness. Pt stated that upon waking up she felt her head was "cottony", headache radiating from the neck upward, lightheadedness upon going to the bathroom, along with palpitation. Pt stated that "she felt she was going to die and that her mind was going to one direction while her body was sitting there", Upon EMS arrival, her blood pressure was elevated to SBP of 180 mmHg. Denied chest pain, blurring of vision, dyspnea, abdominal pain, N/V/D/constipation, unilateral weakness or decreased sensation in the extremities or the face, slurring of speech.   Pt reported her blood pressure being 110-120s at home and compliant to her mediations which includes Amlodipine, Losartan and Metoprolol. She follows with Dr. Perkins Cardiologist in [place] with last Echo and stress test done on 2022 for pre-syncopal episodes from sitting to standing position. Pt no longer has these episodes after increasing her water intake.   She is active on daily basis, walking about 2.5 miles, able to climb one flight of stairs without dyspnea, chest pain, palpitation, or other symptoms.   Pt was diagnosed with hyperthyroidism last year was started on Methimazole 5mg three times weekly [M/W/F], following with Dr. Farzana Armstrong in [facility]. She endorsed having 12 thyroid nodules with three nodules biopsied last year, she is due for biopsy tomorrow.     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   Pt consumes alcohol socially, denied smoking, use marijuana or illicit drugs.

## 2023-12-20 NOTE — H&P ADULT - ASSESSMENT
78 yrs old F, from home, ambulating independently, pmhx of HTN, HLD, hyperthyroidism, osteopenia, small bowel obstruction s/p surgical resection, pshx 2 C-sec, and hysterectomy, presented with elevated blood pressure, palpitation and lightheadedness. Pt is admitted for ACS rule out and hypertensive emergency.  78 yrs old F, from home, ambulating independently, pmhx of HTN, HLD, hyperthyroidism, osteopenia, small bowel obstruction s/p surgical resection, pshx 2 C-sec, and hysterectomy, presented with elevated blood pressure, palpitation and lightheadedness. Pt is admitted for ACS rule out and elevated blood pressure.

## 2023-12-20 NOTE — H&P ADULT - NSHPSOCIALHISTORY_GEN_ALL_CORE
lives alone at home, ambulates independently .  Pt consumes alcohol socially, denied smoking, use marijuana or illicit drugs.

## 2023-12-20 NOTE — ED PROVIDER NOTE - CARE PLAN
1 Principal Discharge DX:	Palpitations   Principal Discharge DX:	Chest pressure  Secondary Diagnosis:	Abnormal EKG

## 2023-12-20 NOTE — ED ADULT NURSE NOTE - NSFALLUNIVINTERV_ED_ALL_ED
Bed/Stretcher in lowest position, wheels locked, appropriate side rails in place/Call bell, personal items and telephone in reach/Instruct patient to call for assistance before getting out of bed/chair/stretcher/Non-slip footwear applied when patient is off stretcher/[place] to call system/Physically safe environment - no spills, clutter or unnecessary equipment/Purposeful proactive rounding/Room/bathroom lighting operational, light cord in reach Bed/Stretcher in lowest position, wheels locked, appropriate side rails in place/Call bell, personal items and telephone in reach/Instruct patient to call for assistance before getting out of bed/chair/stretcher/Non-slip footwear applied when patient is off stretcher/[place] to call system/Physically safe environment - no spills, clutter or unnecessary equipment/Purposeful proactive rounding/Room/bathroom lighting operational, light cord in reach

## 2023-12-20 NOTE — H&P ADULT - PROBLEM SELECTOR PLAN 2
p/w elevated blood pressure, lightheadedness and palpitation  in ED: afebrile, HR 80, /89, Sat 99% on RA  s/p   EKG: SR, T wave inversion in lead III, Q waves, LVH  Trop x2 neg  c/w tele and vitals q4  c/w ASA 81 and Atorvastatin 80 mg   f/u Echo  Cardio Dr. ?? p/w elevated blood pressure, lightheadedness and palpitation  hx of HTN on Losartan 100mg, Metoprolol succ 25 mg, Amlodipine 25 mg   in ED: afebrile, HR 80, /89, Sat 99% on RA  s/p   EKG: SR, T wave inversion in lead III, Q waves, LVH  Trop x2 neg  Pro-BNP neg  c/w tele and vitals q4  c/w Losartan, Metoprolol tart 12.5 mg, Amlodipine with holding parameters and adjust according to BP   monitor BP   Cardiology Dr. Alvarez consulted p/w elevated blood pressure, lightheadedness and palpitation  hx of HTN on Losartan 100mg, Metoprolol succ 25 mg, Amlodipine 25 mg   in ED: afebrile, HR 80, /89, Sat 99% on RA  s/p   EKG: SR, T wave inversion in lead III, Q waves, LVH  Trop x2 neg  Pro-BNP neg  c/w tele and vitals q4  c/w Losartan, Metoprolol tart 12.5 mg, Amlodipine with holding parameters and adjust according to BP   monitor BP   Cardiology Dr. Bui consulted p/w elevated blood pressure, lightheadedness and palpitation  hx of HTN on Losartan 100mg, Metoprolol succ 25 mg, Amlodipine 25 mg   in ED: afebrile, HR 80, /89, Sat 99% on RA  s/p   EKG: SR, T wave inversion in lead III, Q waves, LVH  Trop x2 neg  Pro-BNP neg  c/w tele and vitals q4  c/w Losartan, Metoprolol tart 12.5 mg, Amlodipine with holding parameters and adjust according to BP   if headache, lightheadedness didn't improve to consider CT head to rule out stroke  monitor BP   Cardiology Dr. Bui consulted

## 2023-12-21 ENCOUNTER — TRANSCRIPTION ENCOUNTER (OUTPATIENT)
Age: 78
End: 2023-12-21

## 2023-12-21 VITALS
RESPIRATION RATE: 18 BRPM | DIASTOLIC BLOOD PRESSURE: 61 MMHG | SYSTOLIC BLOOD PRESSURE: 113 MMHG | OXYGEN SATURATION: 100 % | HEART RATE: 73 BPM

## 2023-12-21 LAB
ALBUMIN SERPL ELPH-MCNC: 3.3 G/DL — LOW (ref 3.5–5)
ALBUMIN SERPL ELPH-MCNC: 3.3 G/DL — LOW (ref 3.5–5)
ALP SERPL-CCNC: 60 U/L — SIGNIFICANT CHANGE UP (ref 40–120)
ALP SERPL-CCNC: 60 U/L — SIGNIFICANT CHANGE UP (ref 40–120)
ALT FLD-CCNC: 20 U/L DA — SIGNIFICANT CHANGE UP (ref 10–60)
ALT FLD-CCNC: 20 U/L DA — SIGNIFICANT CHANGE UP (ref 10–60)
ANION GAP SERPL CALC-SCNC: 5 MMOL/L — SIGNIFICANT CHANGE UP (ref 5–17)
ANION GAP SERPL CALC-SCNC: 5 MMOL/L — SIGNIFICANT CHANGE UP (ref 5–17)
AST SERPL-CCNC: 14 U/L — SIGNIFICANT CHANGE UP (ref 10–40)
AST SERPL-CCNC: 14 U/L — SIGNIFICANT CHANGE UP (ref 10–40)
BILIRUB SERPL-MCNC: 0.8 MG/DL — SIGNIFICANT CHANGE UP (ref 0.2–1.2)
BILIRUB SERPL-MCNC: 0.8 MG/DL — SIGNIFICANT CHANGE UP (ref 0.2–1.2)
BUN SERPL-MCNC: 20 MG/DL — HIGH (ref 7–18)
BUN SERPL-MCNC: 20 MG/DL — HIGH (ref 7–18)
CALCIUM SERPL-MCNC: 8.4 MG/DL — SIGNIFICANT CHANGE UP (ref 8.4–10.5)
CALCIUM SERPL-MCNC: 8.4 MG/DL — SIGNIFICANT CHANGE UP (ref 8.4–10.5)
CHLORIDE SERPL-SCNC: 107 MMOL/L — SIGNIFICANT CHANGE UP (ref 96–108)
CHLORIDE SERPL-SCNC: 107 MMOL/L — SIGNIFICANT CHANGE UP (ref 96–108)
CO2 SERPL-SCNC: 28 MMOL/L — SIGNIFICANT CHANGE UP (ref 22–31)
CO2 SERPL-SCNC: 28 MMOL/L — SIGNIFICANT CHANGE UP (ref 22–31)
CREAT SERPL-MCNC: 0.87 MG/DL — SIGNIFICANT CHANGE UP (ref 0.5–1.3)
CREAT SERPL-MCNC: 0.87 MG/DL — SIGNIFICANT CHANGE UP (ref 0.5–1.3)
EGFR: 69 ML/MIN/1.73M2 — SIGNIFICANT CHANGE UP
EGFR: 69 ML/MIN/1.73M2 — SIGNIFICANT CHANGE UP
GLUCOSE SERPL-MCNC: 93 MG/DL — SIGNIFICANT CHANGE UP (ref 70–99)
GLUCOSE SERPL-MCNC: 93 MG/DL — SIGNIFICANT CHANGE UP (ref 70–99)
HCT VFR BLD CALC: 36.7 % — SIGNIFICANT CHANGE UP (ref 34.5–45)
HCT VFR BLD CALC: 36.7 % — SIGNIFICANT CHANGE UP (ref 34.5–45)
HCV AB S/CO SERPL IA: 0.19 S/CO — SIGNIFICANT CHANGE UP (ref 0–0.99)
HCV AB S/CO SERPL IA: 0.19 S/CO — SIGNIFICANT CHANGE UP (ref 0–0.99)
HCV AB SERPL-IMP: SIGNIFICANT CHANGE UP
HCV AB SERPL-IMP: SIGNIFICANT CHANGE UP
HGB BLD-MCNC: 11.6 G/DL — SIGNIFICANT CHANGE UP (ref 11.5–15.5)
HGB BLD-MCNC: 11.6 G/DL — SIGNIFICANT CHANGE UP (ref 11.5–15.5)
MAGNESIUM SERPL-MCNC: 2.1 MG/DL — SIGNIFICANT CHANGE UP (ref 1.6–2.6)
MAGNESIUM SERPL-MCNC: 2.1 MG/DL — SIGNIFICANT CHANGE UP (ref 1.6–2.6)
MCHC RBC-ENTMCNC: 28 PG — SIGNIFICANT CHANGE UP (ref 27–34)
MCHC RBC-ENTMCNC: 28 PG — SIGNIFICANT CHANGE UP (ref 27–34)
MCHC RBC-ENTMCNC: 31.6 GM/DL — LOW (ref 32–36)
MCHC RBC-ENTMCNC: 31.6 GM/DL — LOW (ref 32–36)
MCV RBC AUTO: 88.4 FL — SIGNIFICANT CHANGE UP (ref 80–100)
MCV RBC AUTO: 88.4 FL — SIGNIFICANT CHANGE UP (ref 80–100)
NRBC # BLD: 0 /100 WBCS — SIGNIFICANT CHANGE UP (ref 0–0)
NRBC # BLD: 0 /100 WBCS — SIGNIFICANT CHANGE UP (ref 0–0)
PHOSPHATE SERPL-MCNC: 3.8 MG/DL — SIGNIFICANT CHANGE UP (ref 2.5–4.5)
PHOSPHATE SERPL-MCNC: 3.8 MG/DL — SIGNIFICANT CHANGE UP (ref 2.5–4.5)
PLATELET # BLD AUTO: 288 K/UL — SIGNIFICANT CHANGE UP (ref 150–400)
PLATELET # BLD AUTO: 288 K/UL — SIGNIFICANT CHANGE UP (ref 150–400)
POTASSIUM SERPL-MCNC: 4.2 MMOL/L — SIGNIFICANT CHANGE UP (ref 3.5–5.3)
POTASSIUM SERPL-MCNC: 4.2 MMOL/L — SIGNIFICANT CHANGE UP (ref 3.5–5.3)
POTASSIUM SERPL-SCNC: 4.2 MMOL/L — SIGNIFICANT CHANGE UP (ref 3.5–5.3)
POTASSIUM SERPL-SCNC: 4.2 MMOL/L — SIGNIFICANT CHANGE UP (ref 3.5–5.3)
PROT SERPL-MCNC: 7.1 G/DL — SIGNIFICANT CHANGE UP (ref 6–8.3)
PROT SERPL-MCNC: 7.1 G/DL — SIGNIFICANT CHANGE UP (ref 6–8.3)
RBC # BLD: 4.15 M/UL — SIGNIFICANT CHANGE UP (ref 3.8–5.2)
RBC # BLD: 4.15 M/UL — SIGNIFICANT CHANGE UP (ref 3.8–5.2)
RBC # FLD: 12.6 % — SIGNIFICANT CHANGE UP (ref 10.3–14.5)
RBC # FLD: 12.6 % — SIGNIFICANT CHANGE UP (ref 10.3–14.5)
SODIUM SERPL-SCNC: 140 MMOL/L — SIGNIFICANT CHANGE UP (ref 135–145)
SODIUM SERPL-SCNC: 140 MMOL/L — SIGNIFICANT CHANGE UP (ref 135–145)
WBC # BLD: 5.33 K/UL — SIGNIFICANT CHANGE UP (ref 3.8–10.5)
WBC # BLD: 5.33 K/UL — SIGNIFICANT CHANGE UP (ref 3.8–10.5)
WBC # FLD AUTO: 5.33 K/UL — SIGNIFICANT CHANGE UP (ref 3.8–10.5)
WBC # FLD AUTO: 5.33 K/UL — SIGNIFICANT CHANGE UP (ref 3.8–10.5)

## 2023-12-21 PROCEDURE — 83735 ASSAY OF MAGNESIUM: CPT

## 2023-12-21 PROCEDURE — 99285 EMERGENCY DEPT VISIT HI MDM: CPT

## 2023-12-21 PROCEDURE — 83690 ASSAY OF LIPASE: CPT

## 2023-12-21 PROCEDURE — 99239 HOSP IP/OBS DSCHRG MGMT >30: CPT

## 2023-12-21 PROCEDURE — 80053 COMPREHEN METABOLIC PANEL: CPT

## 2023-12-21 PROCEDURE — 85027 COMPLETE CBC AUTOMATED: CPT

## 2023-12-21 PROCEDURE — 83880 ASSAY OF NATRIURETIC PEPTIDE: CPT

## 2023-12-21 PROCEDURE — 83036 HEMOGLOBIN GLYCOSYLATED A1C: CPT

## 2023-12-21 PROCEDURE — 86803 HEPATITIS C AB TEST: CPT

## 2023-12-21 PROCEDURE — 36415 COLL VENOUS BLD VENIPUNCTURE: CPT

## 2023-12-21 PROCEDURE — 84484 ASSAY OF TROPONIN QUANT: CPT

## 2023-12-21 PROCEDURE — 93306 TTE W/DOPPLER COMPLETE: CPT

## 2023-12-21 PROCEDURE — 84443 ASSAY THYROID STIM HORMONE: CPT

## 2023-12-21 PROCEDURE — 71045 X-RAY EXAM CHEST 1 VIEW: CPT

## 2023-12-21 PROCEDURE — 84100 ASSAY OF PHOSPHORUS: CPT

## 2023-12-21 PROCEDURE — 85610 PROTHROMBIN TIME: CPT

## 2023-12-21 PROCEDURE — 93005 ELECTROCARDIOGRAM TRACING: CPT

## 2023-12-21 PROCEDURE — 85025 COMPLETE CBC W/AUTO DIFF WBC: CPT

## 2023-12-21 PROCEDURE — 80061 LIPID PANEL: CPT

## 2023-12-21 PROCEDURE — 85730 THROMBOPLASTIN TIME PARTIAL: CPT

## 2023-12-21 RX ORDER — ASPIRIN/CALCIUM CARB/MAGNESIUM 324 MG
1 TABLET ORAL
Qty: 0 | Refills: 0 | DISCHARGE
Start: 2023-12-21

## 2023-12-21 RX ORDER — ATORVASTATIN CALCIUM 80 MG/1
20 TABLET, FILM COATED ORAL AT BEDTIME
Refills: 0 | Status: DISCONTINUED | OUTPATIENT
Start: 2023-12-21 | End: 2023-12-21

## 2023-12-21 RX ADMIN — LOSARTAN POTASSIUM 100 MILLIGRAM(S): 100 TABLET, FILM COATED ORAL at 06:09

## 2023-12-21 RX ADMIN — Medication 81 MILLIGRAM(S): at 12:22

## 2023-12-21 RX ADMIN — AMLODIPINE BESYLATE 5 MILLIGRAM(S): 2.5 TABLET ORAL at 06:09

## 2023-12-21 RX ADMIN — Medication 12.5 MILLIGRAM(S): at 06:09

## 2023-12-21 NOTE — DISCHARGE NOTE NURSING/CASE MANAGEMENT/SOCIAL WORK - NSDCPEFALRISK_GEN_ALL_CORE
For information on Fall & Injury Prevention, visit: [email or web address] OR  [email or web address] OR  https://www.cdc.gov/steadi/patient.html For information on Fall & Injury Prevention, visit: [email or web address] OR  [email or web address] OR  https://www.cdc.gov/steadi/patient.html

## 2023-12-21 NOTE — PROGRESS NOTE ADULT - PROBLEM SELECTOR PLAN 4
hx of HLD on Atorvastatin 20 mg   c/w Atorvastatin 80 mg  f/u lipid panel - adjust to home dose if lipid panel normal hx of HLD on Atorvastatin 20 mg   Lipid panel wnl  - c/w Atorvastatin 80 mg

## 2023-12-21 NOTE — DISCHARGE NOTE PROVIDER - NSDCMRMEDTOKEN_GEN_ALL_CORE_FT
amLODIPine 5 mg oral tablet: 1 tab(s) orally once a day  aspirin 81 mg oral tablet, chewable: 1 tab(s) orally once a day  atorvastatin 20 mg oral tablet: 1 tab(s) orally once a day (at bedtime)  Boniva:   losartan 100 mg oral tablet: 1 tab(s) orally once a day  metoprolol succinate 25 mg oral tablet, extended release: 1 tab(s) orally once a day

## 2023-12-21 NOTE — PROGRESS NOTE ADULT - ASSESSMENT
78 yrs old F, from home, ambulating independently, pmhx of HTN, HLD, hyperthyroidism, osteopenia, small bowel obstruction s/p surgical resection, pshx 2 C-sec, and hysterectomy, presented with elevated blood pressure, palpitation and lightheadedness. Pt is admitted for ACS rule out and elevated blood pressure.

## 2023-12-21 NOTE — DISCHARGE NOTE PROVIDER - NSDCCPCAREPLAN_GEN_ALL_CORE_FT
PRINCIPAL DISCHARGE DIAGNOSIS  Diagnosis: ACS (acute coronary syndrome)  Assessment and Plan of Treatment: You came to the hospital complaining of chest pain.   Your EKG showed sinus rhythm with T wave depression. You were admitted to telemetry unit for close monitoring. Your serial cardiac enzymes were negative for eart attack. No arrhythmias were seen on Tele monitor. Your Echo showed normal pumping function of the heart and a grade 1 diastolic dysfuction.  You were seen by cardiologist Dr. Bui, recommends to follow up as outpatient in a week from discharge for further recommendations.  You were given ASPIRIN and ATORVASTATIN.  Please follow up with your primary care physician and  your cardioloigist in one week to inform them of your recent hospitalization and further management of your medical conditions.        SECONDARY DISCHARGE DIAGNOSES  Diagnosis: HTN (hypertension)  Assessment and Plan of Treatment: You have a history of Hypertension. You take Losartan 100mg daily, Metoprolol succ 25 mg daily, Amlodipine 5 mg daily  On this admission, your Blood Pressure was elevated. You were started on your home medication and it improved.   Your blood pressure target is 120-140/80-90, maintain healthy lifestyle, low salt diet, avoid fatty food, weight loss, exercise regularly or stay active as tolerated 30 mins X 3 times per week.  Notify your doctor if you have any of the following symptoms:   (Dizziness, Lightheadedness, Blurry vision, Headache, Chest pain, Shortness of breath.)  Please continue taking your home medications and follow-up with your Primary care physician in 1 week from discharge to adjust medications as needed.      Diagnosis: Hyperthyroidism  Assessment and Plan of Treatment: You have a history of hyperthyoidism. You  takes  Methimazole 5 mg M/W/F. TSH was with normal level in the hospital.   PLEASE CONTINUE YOUR HOME MEDICATION AS DIRECTED.   Follow-up with you Endocrinologist Dr. Farzana Reynolds at [facility]    Diagnosis: Abnormal EKG  Assessment and Plan of Treatment: On admission, you EKG showed normal sinus rhythm with T wave inversion. Please follow-up with your Cardiologist Dr. Watson Perkins     PRINCIPAL DISCHARGE DIAGNOSIS  Diagnosis: ACS (acute coronary syndrome)  Assessment and Plan of Treatment: You came to the hospital complaining of chest pain.   Your EKG showed sinus rhythm with T wave depression. You were admitted to telemetry unit for close monitoring. Your serial cardiac enzymes were negative for eart attack. No arrhythmias were seen on Tele monitor. Your Echo showed normal pumping function of the heart and a grade 1 diastolic dysfuction.  You were seen by cardiologist Dr. Bui, recommends to follow up as outpatient in a week from discharge for further recommendations.  You were given ASPIRIN and ATORVASTATIN.  Please follow up with your primary care physician and  your cardioloigist in one week to inform them of your recent hospitalization and further management of your medical conditions.        SECONDARY DISCHARGE DIAGNOSES  Diagnosis: HTN (hypertension)  Assessment and Plan of Treatment: You have a history of Hypertension. You take Losartan 100mg daily, Metoprolol succ 25 mg daily, Amlodipine 5 mg daily  On this admission, your Blood Pressure was elevated. You were started on your home medication and it improved.   Your blood pressure target is 120-140/80-90, maintain healthy lifestyle, low salt diet, avoid fatty food, weight loss, exercise regularly or stay active as tolerated 30 mins X 3 times per week.  Notify your doctor if you have any of the following symptoms:   (Dizziness, Lightheadedness, Blurry vision, Headache, Chest pain, Shortness of breath.)  Please continue taking your home medications and follow-up with your Primary care physician in 1 week from discharge to adjust medications as needed.      Diagnosis: Hyperthyroidism  Assessment and Plan of Treatment: You have a history of hyperthyoidism. You  takes  Methimazole 5 mg M/W/F. TSH was with normal level in the hospital.   PLEASE CONTINUE YOUR HOME MEDICATION AS DIRECTED.   Follow-up with you Endocrinologist Dr. Farzana Reynolds at [facility]    Diagnosis: Abnormal EKG  Assessment and Plan of Treatment: On admission, you EKG showed normal sinus rhythm with T wave inversion. Please follow-up with your Cardiologist Dr. Watson Perkins     PRINCIPAL DISCHARGE DIAGNOSIS  Diagnosis: ACS (acute coronary syndrome)  Assessment and Plan of Treatment: You came to the hospital complaining of chest pain.   Your EKG showed sinus rhythm with T wave depression. You were admitted to telemetry unit for close monitoring. Your serial cardiac enzymes were negative for eart attack. No arrhythmias were seen on Tele monitor. Your Echo showed normal pumping function of the heart and a grade 1 diastolic dysfuction.  You were seen by cardiologist Dr. Bui, recommends to follow up as outpatient in a week from discharge for further recommendations.  You were given ASPIRIN and ATORVASTATIN.  Please follow up with your primary care physician and  your cardioloigist in one week to inform them of your recent hospitalization and further management of your medical conditions.        SECONDARY DISCHARGE DIAGNOSES  Diagnosis: HTN (hypertension)  Assessment and Plan of Treatment: You have a history of Hypertension. You take Losartan 100mg daily, Metoprolol succ 25 mg daily, Amlodipine 5 mg daily  On this admission, your Blood Pressure was elevated. You were started on your home medication and it improved.   Your blood pressure target is 120-140/80-90, maintain healthy lifestyle, low salt diet, avoid fatty food, weight loss, exercise regularly or stay active as tolerated 30 mins X 3 times per week.  Notify your doctor if you have any of the following symptoms:   (Dizziness, Lightheadedness, Blurry vision, Headache, Chest pain, Shortness of breath.)  Please continue taking your home medications and follow-up with your Primary care physician in 1 week from discharge to adjust medications as needed.      Diagnosis: Hyperthyroidism  Assessment and Plan of Treatment: You have a history of hyperthyoidism. You  takes  Methimazole 5 mg M/W/F. TSH was with normal level in the hospital.   PLEASE CONTINUE YOUR HOME MEDICATION AS DIRECTED.   Follow-up with you Endocrinologist Dr. Farzana Reynolds at [facility]    Diagnosis: Abnormal EKG  Assessment and Plan of Treatment: On admission, you EKG showed normal sinus rhythm with T wave inversion. Please follow-up with your Cardiologist Dr. Watson Perkins    Diagnosis: HLD (hyperlipidemia)  Assessment and Plan of Treatment: You have history of Hyperlipidemia. Yout take Atorvastatin 20mg daily at home. Lipid profile lab test were within normal limit   Please take your medication as prescribed. Maintain healthy lifestyle, low fat diet, exercise regularly and check your lipid levels routinely.   Please follow up with your PCP in 1 week from discharge.       PRINCIPAL DISCHARGE DIAGNOSIS  Diagnosis: ACS (acute coronary syndrome)  Assessment and Plan of Treatment: You came to the hospital complaining of chest pain.   Your EKG showed sinus rhythm with T wave depression. You were admitted to telemetry unit for close monitoring. Your serial cardiac enzymes were negative for eart attack. No arrhythmias were seen on Tele monitor. Your Echo showed normal pumping function of the heart and a grade 1 diastolic dysfuction.  You were seen by cardiologist Dr. Bui, recommends to follow up as outpatient in a week from discharge for further recommendations.  You were given ASPIRIN and ATORVASTATIN.  Please follow up with your primary care physician and  your cardioloigist in one week to inform them of your recent hospitalization and further management of your medical conditions.        SECONDARY DISCHARGE DIAGNOSES  Diagnosis: HTN (hypertension)  Assessment and Plan of Treatment: You have a history of Hypertension. You take Losartan 100mg daily, Metoprolol succ 25 mg daily, Amlodipine 5 mg daily  On this admission, your Blood Pressure was elevated. You were started on your home medication and it improved.   Your blood pressure target is 120-140/80-90, maintain healthy lifestyle, low salt diet, avoid fatty food, weight loss, exercise regularly or stay active as tolerated 30 mins X 3 times per week.  Notify your doctor if you have any of the following symptoms:   (Dizziness, Lightheadedness, Blurry vision, Headache, Chest pain, Shortness of breath.)  Please continue taking your home medications and follow-up with your Primary care physician in 1 week from discharge to adjust medications as needed.      Diagnosis: Hyperthyroidism  Assessment and Plan of Treatment: You have a history of hyperthyoidism. You  takes  Methimazole 5 mg M/W/F. TSH was with normal level in the hospital.   PLEASE CONTINUE YOUR HOME MEDICATION AS DIRECTED.   Follow-up with you Endocrinologist Dr. Farzana Reynolds at [facility]    Diagnosis: Abnormal EKG  Assessment and Plan of Treatment: On admission, you EKG showed normal sinus rhythm with T wave inversion. Please follow-up with your Cardiologist Dr. Watson Perkins    Diagnosis: HLD (hyperlipidemia)  Assessment and Plan of Treatment: You have history of Hyperlipidemia. Yout take Atorvastatin 20mg daily at home. Lipid profile lab test were within normal limit   Please take your medication as prescribed. Maintain healthy lifestyle, low fat diet, exercise regularly and check your lipid levels routinely.   Please follow up with your PCP in 1 week from discharge.       PRINCIPAL DISCHARGE DIAGNOSIS  Diagnosis: ACS (acute coronary syndrome)  Assessment and Plan of Treatment: You came to the hospital complaining of chest pain.   Your EKG showed sinus rhythm with T wave depression. You were admitted to telemetry unit for close monitoring. Your serial cardiac enzymes were negative for eart attack. No arrhythmias were seen on Tele monitor. Your Echo showed normal pumping function of the heart and a grade 1 diastolic dysfuction.  You were seen by cardiologist Dr. Bui, recommends to follow up as outpatient in a week from discharge for further recommendations.  You were given ASPIRIN and ATORVASTATIN.  Please follow up with your primary care physician and  your cardioloigist in one week to inform them of your recent hospitalization and further management of your medical conditions.        SECONDARY DISCHARGE DIAGNOSES  Diagnosis: Abnormal EKG  Assessment and Plan of Treatment: On admission, you EKG showed normal sinus rhythm with T wave inversion. Please follow-up with your Cardiologist Dr. Watson Perkins    Diagnosis: Hyperthyroidism  Assessment and Plan of Treatment: You have a history of hyperthyoidism. You  takes  Methimazole 5 mg M/W/F. TSH was with normal level in the hospital.   PLEASE CONTINUE YOUR HOME MEDICATION AS DIRECTED.   Follow-up with you Endocrinologist Dr. Farzana Reynolds at [facility]    Diagnosis: HTN (hypertension)  Assessment and Plan of Treatment: You have a history of Hypertension. You take Losartan 100mg daily, Metoprolol succ 25 mg daily, Amlodipine 5 mg daily  On this admission, your Blood Pressure was elevated. You were started on your home medication and it improved.   Your blood pressure target is 120-140/80-90, maintain healthy lifestyle, low salt diet, avoid fatty food, weight loss, exercise regularly or stay active as tolerated 30 mins X 3 times per week.  Notify your doctor if you have any of the following symptoms:   (Dizziness, Lightheadedness, Blurry vision, Headache, Chest pain, Shortness of breath.)  Please continue taking your home medications and follow-up with your Primary care physician in 1 week from discharge to adjust medications as needed.      Diagnosis: HLD (hyperlipidemia)  Assessment and Plan of Treatment: You have history of Hyperlipidemia. Yout take Atorvastatin 20mg daily at home. Lipid profile lab test were within normal limit   Please take your medication as prescribed. Maintain healthy lifestyle, low fat diet, exercise regularly and check your lipid levels routinely.   Please follow up with your PCP in 1 week from discharge.       PRINCIPAL DISCHARGE DIAGNOSIS  Diagnosis: ACS (acute coronary syndrome)  Assessment and Plan of Treatment: You came to the hospital complaining of chest pain.   Your EKG showed sinus rhythm with T wave depression. You were admitted to telemetry unit for close monitoring. Your serial cardiac enzymes were negative for eart attack. No arrhythmias were seen on Tele monitor. Your Echo showed normal pumping function of the heart and a grade 1 diastolic dysfuction.  You were seen by cardiologist Dr. Bui, recommends to follow up as outpatient in a week from discharge for further recommendations.  You were given ASPIRIN and ATORVASTATIN.  Please follow up with your primary care physician and  your cardioloigist in one week to inform them of your recent hospitalization and further management of your medical conditions.        SECONDARY DISCHARGE DIAGNOSES  Diagnosis: Abnormal EKG  Assessment and Plan of Treatment: On admission, you EKG showed normal sinus rhythm with T wave inversion. Please follow-up with your Cardiologist Dr. Watson Perkins    Diagnosis: Hyperthyroidism  Assessment and Plan of Treatment: You have a history of hyperthyoidism. You  takes  Methimazole 5 mg M/W/F. TSH was with normal level in the hospital.   PLEASE CONTINUE YOUR HOME MEDICATION AS DIRECTED.   Follow-up with you Endocrinologist Dr. Farzana Reynolds at [facility]    Diagnosis: HTN (hypertension)  Assessment and Plan of Treatment: You have a history of Hypertension. You take Losartan 100mg daily, Metoprolol succ 25 mg daily, Amlodipine 5 mg daily  On this admission, your Blood Pressure was elevated. You were started on your home medication and it improved.   Your blood pressure target is 120-140/80-90, maintain healthy lifestyle, low salt diet, avoid fatty food, weight loss, exercise regularly or stay active as tolerated 30 mins X 3 times per week.  Notify your doctor if you have any of the following symptoms:   (Dizziness, Lightheadedness, Blurry vision, Headache, Chest pain, Shortness of breath.)  Please continue taking your home medications and follow-up with your Primary care physician in 1 week from discharge to adjust medications as needed.      Diagnosis: HLD (hyperlipidemia)  Assessment and Plan of Treatment: You have history of Hyperlipidemia. Yout take Atorvastatin 20mg daily at home. Lipid profile lab test were within normal limit   Please take your medication as prescribed. Maintain healthy lifestyle, low fat diet, exercise regularly and check your lipid levels routinely.   Please follow up with your PCP in 1 week from discharge.

## 2023-12-21 NOTE — DISCHARGE NOTE PROVIDER - NSDCFUADDAPPT_GEN_ALL_CORE_FT
Cardiologist   Dr. Watson Perkins MD  [address] [facility], [place], [place]  [phone]   Cardiologist   Dr. Watson Perkins MD  [address] [facility], [place], [place]  [phone]

## 2023-12-21 NOTE — DISCHARGE NOTE PROVIDER - PROVIDER TOKENS
PROVIDER:[TOKEN:[[id number]],FOLLOWUP:[1 week]],PROVIDER:[TOKEN:[[id number]],FOLLOWUP:[1 week]]

## 2023-12-21 NOTE — PROGRESS NOTE ADULT - PROBLEM SELECTOR PLAN 3
hx of hyperthyroidism on Methimazole 5 mg M/W/F  following with Dr. Farzana Cordero at [facility]  c/w home medication   TSH: Normal hx of hyperthyroidism on Methimazole 5 mg M/W/F  following with Dr. Farzana Cordero at [facility]  c/w home medication   TSH: Normal hx of hyperthyroidism on Methimazole 5 mg M/W/F  following with Dr. Farzana Cordero at [facility]  TSH: Normal  - c/w home medication hx of hyperthyroidism on Methimazole 5 mg M/W/F  following with Dr. Farzana Cordero at [facility]  TSH: Normal  - c/w home medication hx of hyperthyroidism on Methimazole 5 mg M/W/F  following with Dr. Farzana Cordero at [facility]  TSH: Normal  - c/w home medication hx of hyperthyroidism on Methimazole 5 mg M/W/F  following with Dr. Farzana Cordero at [facility]  TSH: Normal  - c/w home medication

## 2023-12-21 NOTE — DISCHARGE NOTE PROVIDER - NSDCFUSCHEDAPPT_GEN_ALL_CORE_FT
[facility] Physician [facility]  [address]  Scheduled Appointment: 12/28/2023     [facility] Physician [facility]  [address]  Scheduled Appointment: 12/28/2023

## 2023-12-21 NOTE — DISCHARGE NOTE PROVIDER - HOSPITAL COURSE
78 F, from home, ambulating independently, pmhx of HTN, HLD, hyperthyroidism, osteopenia, small bowel obstruction s/p surgical resection, pshx 2 C-sec, and hysterectomy, presented with elevated blood pressure, palpitation and lightheadedness. Pt is admitted for ACS rule out and elevated blood pressure.  In the ED, afebrile, HR 80, /89, Sat 99% on RA. CXR showed Lung clear, hiatal hernia. EKG showed SR, T wave inversion in lead III, Q waves, LVH. Aspirin 324mg administered in the ED. Labs showed Troponin negative x2, ProBNP negative. Cardiology Dr. Bui consulted. Started on Aspirin 81mg and Atorvastatin 80mg. Patient admitted for ACS rule out and elevated blood pressure.     Since admission, patient has been afebrile, hemodynamically stable, saturating well on room air. Echo showed EF 74%, G1DD.  Lipid panel was wnl, Atorvastatin 80mg was titrated to 20mg (patient home dose)    For her history of HTN, patient takes  Losartan 100mg QD, Metoprolol succ 25 mg QD, Amlodipine 5 mg QD. Blood pressure has been well controlled since admission. Patient was resumed on her home medication For her hyperthyroidism, Patient takes  Methimazole 5 mg M/W/F. TSH was wnl. Patient follows Dr. Farzana Cordero at [facility]  For her history of HLD, patient takes Atorvastatin 20mg at home. Lipid panel were wnl . Patient was resumed on her home medication    Given patient's improved clinical status and current hemodynamic stability, decision was made to discharge after discussing with attending physician. Please refer to patient's complete medical chart with documents for a full hospital course, for this is only a brief summary.     78 F, from home, ambulating independently, pmhx of HTN, HLD, hyperthyroidism, osteopenia, small bowel obstruction s/p surgical resection, pshx 2 C-sec, and hysterectomy, presented with elevated blood pressure, palpitation and lightheadedness. Pt is admitted for ACS rule out and elevated blood pressure.  In the ED, afebrile, HR 80, /89, Sat 99% on RA. CXR showed Lung clear, hiatal hernia. EKG showed SR, T wave inversion in lead III, Q waves, LVH. Aspirin 324mg administered in the ED. Labs showed Troponin negative x2, ProBNP negative. Cardiology Dr. Bui consulted. Started on Aspirin 81mg and Atorvastatin 80mg. Patient admitted for ACS rule out and elevated blood pressure.     Since admission, patient has been afebrile, hemodynamically stable, saturating well on room air. Echo showed EF 74%, G1DD.  Lipid panel was wnl, Atorvastatin 80mg was titrated to 20mg (patient home dose)    For her history of HTN, patient takes  Losartan 100mg QD, Metoprolol succ 25 mg QD, Amlodipine 5 mg QD. Blood pressure has been well controlled since admission. Patient was resumed on her home medication For her hyperthyroidism, Patient takes  Methimazole 5 mg M/W/F. TSH was wnl. Patient follows Dr. Farzana Cordero at [facility]  For her history of HLD, patient takes Atorvastatin 20mg at home. Lipid panel were wnl . Patient was resumed on her home medication    Given patient's improved clinical status and current hemodynamic stability, decision was made to discharge after discussing with attending physician. Please refer to patient's complete medical chart with documents for a full hospital course, for this is only a brief summary.

## 2023-12-21 NOTE — CONSULT NOTE ADULT - SUBJECTIVE AND OBJECTIVE BOX
C A R D I O L O G Y  *********************    DATE OF SERVICE: 12-21-23    HISTORY OF PRESENT ILLNESS:   78 yrs old F, from home, ambulating independently, pmhx of HTN, HLD, hyperthyroidism, osteopenia, small bowel obstruction s/p surgical resection, pshx 2 C-sec, and hysterectomy, presented with elevated blood pressure, palpitation and lightheadedness. Pt stated that upon waking up she felt her head was "cottony", headache radiating from the neck upward, lightheadedness upon going to the bathroom, along with palpitation. Pt stated that "she felt she was going to die and that her mind was going to one direction while her body was sitting there", Upon EMS arrival, her blood pressure was elevated to SBP of 180 mmHg. Denied chest pain, blurring of vision, dyspnea, abdominal pain, N/V/D/constipation, unilateral weakness or decreased sensation in the extremities or the face, slurring of speech.   Pt reported her blood pressure being 110-120s at home and compliant to her mediations which includes Amlodipine, Losartan and Metoprolol. She follows with Dr. Perkins Cardiologist in [place] with last Echo and stress test done on 2022 for pre-syncopal episodes from sitting to standing position. Pt no longer has these episodes after increasing her water intake.   She is active on daily basis, walking about 2.5 miles, able to climb one flight of stairs without dyspnea, chest pain, palpitation, or other symptoms.   Pt was diagnosed with hyperthyroidism last year was started on Methimazole 5mg three times weekly [M/W/F], following with Dr. Farzana Armstrong in [facility]. She endorsed having 12 thyroid nodules with three nodules biopsied last year, she is due for biopsy tomorrow.     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   Pt consumes alcohol socially, denied smoking, use marijuana or illicit drugs.  (20 Dec 2023 10:45)      PAST MEDICAL & SURGICAL HISTORY:      H/O hyperthyroidism  Hypertension  High cholesterol  Osteopenia        MEDICATIONS:  MEDICATIONS  (STANDING):  amLODIPine   Tablet 5 milliGRAM(s) Oral daily  aspirin  chewable 81 milliGRAM(s) Oral daily  atorvastatin 80 milliGRAM(s) Oral at bedtime  enoxaparin Injectable 40 milliGRAM(s) SubCutaneous every 24 hours  losartan 100 milliGRAM(s) Oral daily  metoprolol tartrate 12.5 milliGRAM(s) Oral two times a day      Allergies    No Known Allergies    Intolerances        FAMILY HISTORY:    Non-contributary for premature coronary disease or sudden cardiac death    SOCIAL HISTORY:    [X ] Non-smoker  [ ] Smoker  [ ] Alcohol    REVIEW OF SYSTEMS:  [ ]chest pain  [  ]shortness of breath  [  ]palpitations  [  ]syncope  [ ]near syncope [ ]upper extremity weakness   [ ] lower extremity weakness  [  ]diplopia  [  ]altered mental status   [  ]fevers  [ ]chills [ ]nausea  [ ]vomitting  [  ]dysphagia    [ ]abdominal pain  [ ]melena  [ ]BRBPR    [  ]epistaxis  [  ]rash    [ ]lower extremity edema        [X] All others negative	  [ ] Unable to obtain      LABS:	 	    CARDIAC MARKERS:        Troponin I, High Sensitivity Result: 6.1 ng/L (12-20-23 @ 09:20)  Troponin I, High Sensitivity Result: 5.6 ng/L (12-20-23 @ 07:40)                            11.6   5.33  )-----------( 288      ( 21 Dec 2023 05:17 )             36.7     Hb Trend: 11.6<--    12-21    140  |  107  |  20<H>  ----------------------------<  93  4.2   |  28  |  0.87    Ca    8.4      21 Dec 2023 05:17  Phos  3.8     12-21  Mg     2.1     12-21    TPro  7.1  /  Alb  3.3<L>  /  TBili  0.8  /  DBili  x   /  AST  14  /  ALT  20  /  AlkPhos  60  12-21    Creatinine Trend: 0.87<--, 0.98<--    PHYSICAL EXAM:  T(C): 36.2 (12-21-23 @ 07:26), Max: 36.9 (12-20-23 @ 23:33)  HR: 65 (12-21-23 @ 07:26) (63 - 83)  BP: 122/52 (12-21-23 @ 07:26) (106/60 - 148/76)  RR: 18 (12-21-23 @ 07:26) (18 - 18)  SpO2: 100% (12-21-23 @ 07:26) (97% - 100%)  Wt(kg): --   BMI (kg/m2): 22.5 (12-20-23 @ 06:58)  I&O's Summary      Gen: Appears well in NAD  HEENT:  (-)icterus (-)pallor  CV: N S1 S2 1/6 JEAN (+)2 Pulses B/l  Resp:  Clear to ausculatation B/L, normal effort  GI: (+) BS Soft, NT, ND  Lymph:  (-)Edema, (-)obvious lymphadenopathy  Skin: Warm to touch, Normal turgor  Psych: Appropriate mood and affect  	      ECG:  	Sinus 78 BPM, cannot exclude inferior infarct og     RADIOLOGY:         CXR: < from: Xray Chest 1 View- PORTABLE-Urgent (12.20.23 @ 08:34) >  Probable hiatal hernia.    < end of copied text >      ASSESSMENT/PLAN: 	78y Female  pmhx of HTN, HLD, hyperthyroidism, osteopenia, small bowel obstruction s/p surgical resection, pshx 2 C-sec, and hysterectomy, presented with elevated blood pressure, palpitation and lightheadedness found with hypertensive urgency    # Abnormal EKG  - Check echo  -  pt with historically normal LV and RV fx    # HTN urgency  - BP improving    # Palpitation  - cont tele    I once again thank you for allowing me to participate in the care of your patient.  If you have any questions or concerns please do not hesitate to contact me.    Ifeanyi Bui MD, [facility]  BEEPER [phone]       C A R D I O L O G Y  *********************    DATE OF SERVICE: 12-21-23    HISTORY OF PRESENT ILLNESS:   78 yrs old F, from home, ambulating independently, pmhx of HTN, HLD, hyperthyroidism, osteopenia, small bowel obstruction s/p surgical resection, pshx 2 C-sec, and hysterectomy, presented with elevated blood pressure, palpitation and lightheadedness. Pt stated that upon waking up she felt her head was "cottony", headache radiating from the neck upward, lightheadedness upon going to the bathroom, along with palpitation. Pt stated that "she felt she was going to die and that her mind was going to one direction while her body was sitting there", Upon EMS arrival, her blood pressure was elevated to SBP of 180 mmHg. Denied chest pain, blurring of vision, dyspnea, abdominal pain, N/V/D/constipation, unilateral weakness or decreased sensation in the extremities or the face, slurring of speech.   Pt reported her blood pressure being 110-120s at home and compliant to her mediations which includes Amlodipine, Losartan and Metoprolol. She follows with Dr. Perkins Cardiologist in [place] with last Echo and stress test done on 2022 for pre-syncopal episodes from sitting to standing position. Pt no longer has these episodes after increasing her water intake.   She is active on daily basis, walking about 2.5 miles, able to climb one flight of stairs without dyspnea, chest pain, palpitation, or other symptoms.   Pt was diagnosed with hyperthyroidism last year was started on Methimazole 5mg three times weekly [M/W/F], following with Dr. Farzana Armstrong in [facility]. She endorsed having 12 thyroid nodules with three nodules biopsied last year, she is due for biopsy tomorrow.     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   Pt consumes alcohol socially, denied smoking, use marijuana or illicit drugs.  (20 Dec 2023 10:45)      PAST MEDICAL & SURGICAL HISTORY:      H/O hyperthyroidism  Hypertension  High cholesterol  Osteopenia        MEDICATIONS:  MEDICATIONS  (STANDING):  amLODIPine   Tablet 5 milliGRAM(s) Oral daily  aspirin  chewable 81 milliGRAM(s) Oral daily  atorvastatin 80 milliGRAM(s) Oral at bedtime  enoxaparin Injectable 40 milliGRAM(s) SubCutaneous every 24 hours  losartan 100 milliGRAM(s) Oral daily  metoprolol tartrate 12.5 milliGRAM(s) Oral two times a day      Allergies    No Known Allergies    Intolerances        FAMILY HISTORY:    Non-contributary for premature coronary disease or sudden cardiac death    SOCIAL HISTORY:    [X ] Non-smoker  [ ] Smoker  [ ] Alcohol    REVIEW OF SYSTEMS:  [ ]chest pain  [  ]shortness of breath  [  ]palpitations  [  ]syncope  [ ]near syncope [ ]upper extremity weakness   [ ] lower extremity weakness  [  ]diplopia  [  ]altered mental status   [  ]fevers  [ ]chills [ ]nausea  [ ]vomitting  [  ]dysphagia    [ ]abdominal pain  [ ]melena  [ ]BRBPR    [  ]epistaxis  [  ]rash    [ ]lower extremity edema        [X] All others negative	  [ ] Unable to obtain      LABS:	 	    CARDIAC MARKERS:        Troponin I, High Sensitivity Result: 6.1 ng/L (12-20-23 @ 09:20)  Troponin I, High Sensitivity Result: 5.6 ng/L (12-20-23 @ 07:40)                            11.6   5.33  )-----------( 288      ( 21 Dec 2023 05:17 )             36.7     Hb Trend: 11.6<--    12-21    140  |  107  |  20<H>  ----------------------------<  93  4.2   |  28  |  0.87    Ca    8.4      21 Dec 2023 05:17  Phos  3.8     12-21  Mg     2.1     12-21    TPro  7.1  /  Alb  3.3<L>  /  TBili  0.8  /  DBili  x   /  AST  14  /  ALT  20  /  AlkPhos  60  12-21    Creatinine Trend: 0.87<--, 0.98<--    PHYSICAL EXAM:  T(C): 36.2 (12-21-23 @ 07:26), Max: 36.9 (12-20-23 @ 23:33)  HR: 65 (12-21-23 @ 07:26) (63 - 83)  BP: 122/52 (12-21-23 @ 07:26) (106/60 - 148/76)  RR: 18 (12-21-23 @ 07:26) (18 - 18)  SpO2: 100% (12-21-23 @ 07:26) (97% - 100%)  Wt(kg): --   BMI (kg/m2): 22.5 (12-20-23 @ 06:58)  I&O's Summary      Gen: Appears well in NAD  HEENT:  (-)icterus (-)pallor  CV: N S1 S2 1/6 JEAN (+)2 Pulses B/l  Resp:  Clear to ausculatation B/L, normal effort  GI: (+) BS Soft, NT, ND  Lymph:  (-)Edema, (-)obvious lymphadenopathy  Skin: Warm to touch, Normal turgor  Psych: Appropriate mood and affect  	      ECG:  	Sinus 78 BPM, cannot exclude inferior infarct og     RADIOLOGY:         CXR: < from: Xray Chest 1 View- PORTABLE-Urgent (12.20.23 @ 08:34) >  Probable hiatal hernia.    < end of copied text >      ASSESSMENT/PLAN: 	78y Female  pmhx of HTN, HLD, hyperthyroidism, osteopenia, small bowel obstruction s/p surgical resection, pshx 2 C-sec, and hysterectomy, presented with elevated blood pressure, palpitation and lightheadedness found with hypertensive urgency    # Abnormal EKG  - Check echo  -  pt with historically normal LV and RV fx    # HTN urgency  - BP improving    # Palpitation  - cont tele    I once again thank you for allowing me to participate in the care of your patient.  If you have any questions or concerns please do not hesitate to contact me.    Ifeanyi Bui MD, [facility]  BEEPER [phone]

## 2023-12-21 NOTE — PROGRESS NOTE ADULT - PROBLEM SELECTOR PLAN 1
p/w elevated blood pressure, lightheadedness and palpitation  in ED: afebrile, HR 80, /89, Sat 99% on RA  s/p   EKG: SR, T wave inversion in lead III, Q waves, LVH  Trop x2 neg  c/w tele and vitals q4  c/w ASA 81 and Atorvastatin 80 mg   f/u Echo  Cardio Dr. Bui consulted p/w elevated blood pressure, lightheadedness and palpitation  in ED: afebrile, HR 80, /89, Sat 99% on RA  s/p   EKG: SR, T wave inversion in lead III, Q waves, LVH  Trop x2 neg  Cardio Dr. Bui consulted  - c/w tele and vitals q4  - c/w ASA 81 and Atorvastatin 80 mg   - f/u Echo  Cardio Dr. Bui consulted p/w elevated blood pressure, lightheadedness and palpitation  in ED: afebrile, HR 80, /89, Sat 99% on RA  s/p   EKG: SR, T wave inversion in lead III, Q waves, LVH  Trop x2 neg  Echo: EF 74%, G1DD  Cardio Dr. Bui consulted  - c/w tele and vitals q4  - c/w ASA 81 and Atorvastatin 80 mg p/w elevated blood pressure, lightheadedness and palpitation  in ED: afebrile, HR 80, /89, Sat 99% on RA  s/p   EKG: SR, T wave inversion in lead III, Q waves, LVH  Trop x2 neg  Echo: EF 74%, G1DD  CXR: Lung clear, hiatal hernia  Cardio Dr. Bui consulted  - c/w tele and vitals q4  - c/w ASA 81 and Atorvastatin 80 mg p/w elevated blood pressure, lightheadedness and palpitation  in ED: afebrile, HR 80, /89, Sat 99% on RA  s/p   EKG: SR, T wave inversion in lead III, Q waves, LVH  Trop x2 neg  Echo: EF 74%, G1DD  CXR: Lung clear, hiatal hernia  Cardio Dr. Bui consulted  Out pt cardiologist Dr. Watson Perkins   - c/w tele and vitals q4  - c/w ASA 81 and Atorvastatin 80 mg

## 2023-12-21 NOTE — PROGRESS NOTE ADULT - PROBLEM SELECTOR PLAN 2
p/w elevated blood pressure, lightheadedness and palpitation  hx of HTN on Losartan 100mg, Metoprolol succ 25 mg, Amlodipine 25 mg   in ED: afebrile, HR 80, /89, Sat 99% on RA  s/p   EKG: SR, T wave inversion in lead III, Q waves, LVH  Trop x2 neg  Pro-BNP neg  c/w tele and vitals q4  c/w Losartan, Metoprolol tart 12.5 mg, Amlodipine with holding parameters and adjust according to BP   if headache, lightheadedness didn't improve to consider CT head to rule out stroke  monitor BP   Cardiology Dr. Bui consulted p/w elevated blood pressure, lightheadedness and palpitation  hx of HTN on Losartan 100mg, Metoprolol succ 25 mg, Amlodipine 25 mg   in ED: afebrile, HR 80, /89, Sat 99% on RA  s/p   EKG: SR, T wave inversion in lead III, Q waves, LVH  Trop x2 neg  Pro-BNP neg  - c/w tele and vitals q4  - c/w Losartan, Metoprolol tart 12.5 mg, Amlodipine with holding parameters and adjust according to BP     monitor BP   Cardiology Dr. Bui consulted p/w elevated blood pressure, lightheadedness and palpitation  hx of HTN on Losartan 100mg, Metoprolol succ 25 mg, Amlodipine 5 mg   in ED: afebrile, HR 80, /89, Sat 99% on RA  s/p   EKG: SR, T wave inversion in lead III, Q waves, LVH  Trop x2 neg  Pro-BNP neg  - c/w tele and vitals q4  - c/w Losartan, Metoprolol tart 12.5 mg, Amlodipine with holding parameters and adjust according to BP     monitor BP   Cardiology Dr. Bui consulted

## 2023-12-21 NOTE — DISCHARGE NOTE PROVIDER - ATTENDING DISCHARGE PHYSICAL EXAMINATION:
Gen: NAD  Neuro: alert, answering qs appropriately, moves all extremities  HEENT: anicteric, moist oral mucosa  Neck: supple  Cards: no murmurs appreicated  Pulm: good inspiratory effort, breathing comfortably  Abd: soft, NT/ND, BS+  Ext: no edema  Skin: warm, dry    #ACS ruled out  #Abnormal EKG

## 2023-12-21 NOTE — PROGRESS NOTE ADULT - SUBJECTIVE AND OBJECTIVE BOX
PGY-1 Progress Note discussed with attending    PAGER #: [[phone]] TILL 5:00 PM  PLEASE CONTACT ON CALL TEAM:  - On Call Team (Please refer to Claudy) FROM 5:00 PM - 8:30PM  - Nightfloat Team FROM 8:30 -7:30 AM    INTERVAL HPI/OVERNIGHT EVENTS:   No overnight events. Pt was examined in the morning at bedside and she is in NAD. Pt denies any symptom    REVIEW OF SYSTEMS:  CONSTITUTIONAL: No fever, chills,  or fatigue  RESPIRATORY: No cough, wheezing, No shortness of breath  CARDIOVASCULAR: No chest pain, palpitations,  leg swelling  GASTROINTESTINAL: No abdominal pain. No nausea, vomiting, or hematemesis; No diarrhea or constipation. No bloody or black stool  GENITOURINARY: No dysuria or hematuria, urinary frequency  NEUROLOGICAL: No headaches, dizziness  SKIN: No itching, burning, rashes, or lesions     Vital Signs Last 24 Hrs  T(C): 36.2 (21 Dec 2023 07:26), Max: 36.9 (20 Dec 2023 23:33)  T(F): 97.2 (21 Dec 2023 07:26), Max: 98.4 (20 Dec 2023 23:33)  HR: 65 (21 Dec 2023 07:26) (63 - 83)  BP: 122/52 (21 Dec 2023 07:26) (106/60 - 148/76)  BP(mean): --  RR: 18 (21 Dec 2023 07:26) (18 - 18)  SpO2: 100% (21 Dec 2023 07:26) (97% - 100%)    Parameters below as of 21 Dec 2023 07:26  Patient On (Oxygen Delivery Method): room air        PHYSICAL EXAMINATION:  GENERAL: NAD,   HEAD:  Atraumatic, Normocephalic  EYES:  PERRLA,  conjunctiva and sclera clear  NECK: Supple, No JVD,   CHEST/LUNG: Clear to auscultation. Clear to percussion bilaterally; No rales, rhonchi, wheezing, or rubs  HEART: Regular rate and rhythm; No murmurs, rubs, or gallops  ABDOMEN: Soft, Nontender, Nondistended; Bowel sounds present, No Rovsing's sign   NERVOUS SYSTEM:  Alert & Oriented X3,    EXTREMITIES:  2+ Peripheral Pulses, No clubbing, cyanosis, or edema  CALF: No Calf tenderness   SKIN: warm dry                          11.6   5.33  )-----------( 288      ( 21 Dec 2023 05:17 )             36.7     12-21    140  |  107  |  20<H>  ----------------------------<  93  4.2   |  28  |  0.87    Ca    8.4      21 Dec 2023 05:17  Phos  3.8     12-21  Mg     2.1     12-21    TPro  7.1  /  Alb  3.3<L>  /  TBili  0.8  /  DBili  x   /  AST  14  /  ALT  20  /  AlkPhos  60  12-21    LIVER FUNCTIONS - ( 21 Dec 2023 05:17 )  Alb: 3.3 g/dL / Pro: 7.1 g/dL / ALK PHOS: 60 U/L / ALT: 20 U/L DA / AST: 14 U/L / GGT: x               PT/INR - ( 20 Dec 2023 07:40 )   PT: 11.5 sec;   INR: 1.01 ratio         PTT - ( 20 Dec 2023 07:40 )  PTT:27.9 sec    CAPILLARY BLOOD GLUCOSE      RADIOLOGY & ADDITIONAL TESTS:                   PGY-1 Progress Note discussed with attending    PAGER #: [[phone]] TILL 5:00 PM  PLEASE CONTACT ON CALL TEAM:  - On Call Team (Please refer to Claudy) FROM 5:00 PM - 8:30PM  - Nightfloat Team FROM 8:30 -7:30 AM    INTERVAL HPI/OVERNIGHT EVENTS:   No overnight events. Pt was examined in the morning at bedside and she is in NAD. Pt denies any symptom    REVIEW OF SYSTEMS:  CONSTITUTIONAL: No fever, chills,  or fatigue  RESPIRATORY: No cough, wheezing, No shortness of breath  CARDIOVASCULAR: No chest pain, palpitations,  leg swelling  GASTROINTESTINAL: No abdominal pain. No nausea, vomiting, or hematemesis; No diarrhea or constipation. No bloody or black stool  GENITOURINARY: No dysuria or hematuria, urinary frequency  NEUROLOGICAL: No headaches, dizziness  SKIN: No itching, burning, rashes, or lesions     Vital Signs Last 24 Hrs  T(C): 36.2 (21 Dec 2023 07:26), Max: 36.9 (20 Dec 2023 23:33)  T(F): 97.2 (21 Dec 2023 07:26), Max: 98.4 (20 Dec 2023 23:33)  HR: 65 (21 Dec 2023 07:26) (63 - 83)  BP: 122/52 (21 Dec 2023 07:26) (106/60 - 148/76)  BP(mean): --  RR: 18 (21 Dec 2023 07:26) (18 - 18)  SpO2: 100% (21 Dec 2023 07:26) (97% - 100%)    Parameters below as of 21 Dec 2023 07:26  Patient On (Oxygen Delivery Method): room air        PHYSICAL EXAMINATION:  GENERAL: NAD,   HEAD:  Atraumatic, Normocephalic  EYES:  PERRLA,  conjunctiva and sclera clear  NECK: Supple, No JVD,   CHEST/LUNG: Clear to auscultation. Clear to percussion bilaterally; No rales, rhonchi, wheezing, or rubs  HEART: Regular rate and rhythm; No murmurs, rubs, or gallops  ABDOMEN: Soft, Nontender, Nondistended; Bowel sounds present, No Rovsing's sign   NERVOUS SYSTEM:  Alert & Oriented X3,    EXTREMITIES:  2+ Peripheral Pulses, No clubbing, cyanosis, or edema  CALF: No Calf tenderness   SKIN: warm dry                          11.6   5.33  )-----------( 288      ( 21 Dec 2023 05:17 )             36.7     12-21    140  |  107  |  20<H>  ----------------------------<  93  4.2   |  28  |  0.87    Ca    8.4      21 Dec 2023 05:17  Phos  3.8     12-21  Mg     2.1     12-21    TPro  7.1  /  Alb  3.3<L>  /  TBili  0.8  /  DBili  x   /  AST  14  /  ALT  20  /  AlkPhos  60  12-21    LIVER FUNCTIONS - ( 21 Dec 2023 05:17 )  Alb: 3.3 g/dL / Pro: 7.1 g/dL / ALK PHOS: 60 U/L / ALT: 20 U/L DA / AST: 14 U/L / GGT: x               PT/INR - ( 20 Dec 2023 07:40 )   PT: 11.5 sec;   INR: 1.01 ratio         PTT - ( 20 Dec 2023 07:40 )  PTT:27.9 sec    CAPILLARY BLOOD GLUCOSE      RADIOLOGY & ADDITIONAL TESTS:

## 2023-12-21 NOTE — DISCHARGE NOTE PROVIDER - CARE PROVIDER_API CALL
Drew Warner  Internal Medicine  [address] [place] [address]  Phone: [phone]  Fax: [phone]  Follow Up Time: 1 week    Farzana Reynolds  Endocrinology/Metab/Diabetes  [address] [address]  Phone: [phone]  Fax: [phone]  Follow Up Time: 1 week   Drew Warner  Internal Medicine  [address] [place] [address]  Phone: [phone]  Fax: [phone]  Follow Up Time: 1 week    Farzana Reynolds  Endocrinology/Metab/Diabetes  [address] [address]  Phone: [phone]  Fax: [phone]  Follow Up Time: 1 week

## 2023-12-21 NOTE — DISCHARGE NOTE NURSING/CASE MANAGEMENT/SOCIAL WORK - NSDCFUADDAPPT_GEN_ALL_CORE_FT
Cardiologist   Dr. Watson Perkins MD  [address] [facility], [place], [place]  [phone]   Cardiologist   Dr. Watson Perkins MD  [address] [facility], [place], [place]  [phone]

## 2023-12-28 ENCOUNTER — APPOINTMENT (OUTPATIENT)
Dept: BREAST CENTER | Facility: CLINIC | Age: 78
End: 2023-12-28
Payer: MEDICARE

## 2023-12-28 VITALS
SYSTOLIC BLOOD PRESSURE: 118 MMHG | HEART RATE: 78 BPM | DIASTOLIC BLOOD PRESSURE: 69 MMHG | BODY MASS INDEX: 23 KG/M2 | WEIGHT: 125 LBS | HEIGHT: 62 IN

## 2023-12-28 DIAGNOSIS — N60.19 DIFFUSE CYSTIC MASTOPATHY OF UNSPECIFIED BREAST: ICD-10-CM

## 2023-12-28 DIAGNOSIS — D24.1 BENIGN NEOPLASM OF RIGHT BREAST: ICD-10-CM

## 2023-12-28 DIAGNOSIS — E05.90 THYROTOXICOSIS, UNSPECIFIED W/OUT THYROTOXIC CRISIS OR STORM: ICD-10-CM

## 2023-12-28 DIAGNOSIS — Z80.3 FAMILY HISTORY OF MALIGNANT NEOPLASM OF BREAST: ICD-10-CM

## 2023-12-28 DIAGNOSIS — M85.80 OTHER SPECIFIED DISORDERS OF BONE DENSITY AND STRUCTURE, UNSPECIFIED SITE: ICD-10-CM

## 2023-12-28 DIAGNOSIS — E04.1 NONTOXIC SINGLE THYROID NODULE: ICD-10-CM

## 2023-12-28 DIAGNOSIS — R92.30 DENSE BREASTS, UNSPECIFIED: ICD-10-CM

## 2023-12-28 DIAGNOSIS — Z86.010 PERSONAL HISTORY OF COLONIC POLYPS: ICD-10-CM

## 2023-12-28 PROBLEM — E78.00 PURE HYPERCHOLESTEROLEMIA, UNSPECIFIED: Chronic | Status: ACTIVE | Noted: 2023-12-20

## 2023-12-28 PROBLEM — Z86.39 PERSONAL HISTORY OF OTHER ENDOCRINE, NUTRITIONAL AND METABOLIC DISEASE: Chronic | Status: ACTIVE | Noted: 2023-12-20

## 2023-12-28 PROBLEM — I10 ESSENTIAL (PRIMARY) HYPERTENSION: Chronic | Status: ACTIVE | Noted: 2023-12-20

## 2023-12-28 PROCEDURE — 99214 OFFICE O/P EST MOD 30 MIN: CPT

## 2023-12-28 RX ORDER — IBANDRONATE SODIUM 150 MG/1
150 TABLET ORAL
Refills: 0 | Status: ACTIVE | COMMUNITY

## 2023-12-28 RX ORDER — METHIMAZOLE 5 MG/1
5 TABLET ORAL
Refills: 0 | Status: ACTIVE | COMMUNITY

## 2023-12-28 NOTE — PHYSICAL EXAM
[Normocephalic] : normocephalic [Atraumatic] : atraumatic [Supple] : supple [No Supraclavicular Adenopathy] : no supraclavicular adenopathy [No Cervical Adenopathy] : no cervical adenopathy [Normal Sinus Rhythm] : normal sinus rhythm [Examined in the supine and seated position] : examined in the supine and seated position [No dominant masses] : no dominant masses in right breast  [No dominant masses] : no dominant masses left breast [No Nipple Retraction] : no left nipple retraction [No Nipple Discharge] : no left nipple discharge [No Axillary Lymphadenopathy] : no left axillary lymphadenopathy [No Edema] : no edema [No Rashes] : no rashes [No Ulceration] : no ulceration [de-identified] : +thyromegaly > followed [de-identified] : +FC tissue NSF  [de-identified] : +FC tissue NSF

## 2023-12-28 NOTE — HISTORY OF PRESENT ILLNESS
[FreeTextEntry1] : S/P R Sono Core Bx ( R 10:00 N4)(10/30/17): +complex FA +FH Br Ca ( Mother 52, M. Aunts 51, 83, M. 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 ROS reviewed/discussed. Taking Vit D. Last Bone Densitometry (): +osteopenia Mammo/Sono (23): FG, NSF

## 2024-07-03 ENCOUNTER — APPOINTMENT (OUTPATIENT)
Dept: BREAST CENTER | Facility: CLINIC | Age: 79
End: 2024-07-03
Payer: MEDICARE

## 2024-07-03 VITALS — HEIGHT: 62 IN | BODY MASS INDEX: 22.82 KG/M2 | WEIGHT: 124 LBS

## 2024-07-03 DIAGNOSIS — Z80.3 FAMILY HISTORY OF MALIGNANT NEOPLASM OF BREAST: ICD-10-CM

## 2024-07-03 DIAGNOSIS — Z86.010 PERSONAL HISTORY OF COLONIC POLYPS: ICD-10-CM

## 2024-07-03 DIAGNOSIS — R92.30 DENSE BREASTS, UNSPECIFIED: ICD-10-CM

## 2024-07-03 DIAGNOSIS — R92.2 INCONCLUSIVE MAMMOGRAM: ICD-10-CM

## 2024-07-03 DIAGNOSIS — N60.19 DIFFUSE CYSTIC MASTOPATHY OF UNSPECIFIED BREAST: ICD-10-CM

## 2024-07-03 DIAGNOSIS — D24.1 BENIGN NEOPLASM OF RIGHT BREAST: ICD-10-CM

## 2024-07-03 DIAGNOSIS — M85.80 OTHER SPECIFIED DISORDERS OF BONE DENSITY AND STRUCTURE, UNSPECIFIED SITE: ICD-10-CM

## 2024-07-03 DIAGNOSIS — R92.30 INCONCLUSIVE MAMMOGRAM: ICD-10-CM

## 2024-07-03 PROCEDURE — 99213 OFFICE O/P EST LOW 20 MIN: CPT

## 2024-07-03 RX ORDER — IBANDRONATE SODIUM 3 MG/3ML
INJECTION, SOLUTION INTRAVENOUS
Refills: 0 | Status: ACTIVE | COMMUNITY

## 2024-12-16 ENCOUNTER — OUTPATIENT (OUTPATIENT)
Dept: OUTPATIENT SERVICES | Facility: HOSPITAL | Age: 79
LOS: 1 days | End: 2024-12-16
Payer: MEDICARE

## 2024-12-16 ENCOUNTER — APPOINTMENT (OUTPATIENT)
Dept: MAMMOGRAPHY | Facility: IMAGING CENTER | Age: 79
End: 2024-12-16
Payer: MEDICARE

## 2024-12-16 ENCOUNTER — RESULT REVIEW (OUTPATIENT)
Age: 79
End: 2024-12-16

## 2024-12-16 ENCOUNTER — APPOINTMENT (OUTPATIENT)
Dept: ULTRASOUND IMAGING | Facility: IMAGING CENTER | Age: 79
End: 2024-12-16
Payer: MEDICARE

## 2024-12-16 DIAGNOSIS — Z12.31 ENCOUNTER FOR SCREENING MAMMOGRAM FOR MALIGNANT NEOPLASM OF BREAST: ICD-10-CM

## 2024-12-16 DIAGNOSIS — R92.2 INCONCLUSIVE MAMMOGRAM: ICD-10-CM

## 2024-12-16 PROCEDURE — 77063 BREAST TOMOSYNTHESIS BI: CPT | Mod: 26

## 2024-12-16 PROCEDURE — 77067 SCR MAMMO BI INCL CAD: CPT

## 2024-12-16 PROCEDURE — 77067 SCR MAMMO BI INCL CAD: CPT | Mod: 26

## 2024-12-16 PROCEDURE — 76641 ULTRASOUND BREAST COMPLETE: CPT

## 2024-12-16 PROCEDURE — 77063 BREAST TOMOSYNTHESIS BI: CPT

## 2024-12-16 PROCEDURE — 76641 ULTRASOUND BREAST COMPLETE: CPT | Mod: 26,50,GA

## 2025-01-27 ENCOUNTER — APPOINTMENT (OUTPATIENT)
Dept: BREAST CENTER | Facility: CLINIC | Age: 80
End: 2025-01-27
Payer: MEDICARE

## 2025-01-27 VITALS
HEIGHT: 62 IN | SYSTOLIC BLOOD PRESSURE: 136 MMHG | DIASTOLIC BLOOD PRESSURE: 73 MMHG | HEART RATE: 65 BPM | WEIGHT: 124 LBS | BODY MASS INDEX: 22.82 KG/M2

## 2025-01-27 DIAGNOSIS — R92.30 DENSE BREASTS, UNSPECIFIED: ICD-10-CM

## 2025-01-27 DIAGNOSIS — Z80.3 FAMILY HISTORY OF MALIGNANT NEOPLASM OF BREAST: ICD-10-CM

## 2025-01-27 DIAGNOSIS — Z86.0100 PERSONAL HISTORY OF COLON POLYPS, UNSPECIFIED: ICD-10-CM

## 2025-01-27 DIAGNOSIS — N60.19 DIFFUSE CYSTIC MASTOPATHY OF UNSPECIFIED BREAST: ICD-10-CM

## 2025-01-27 DIAGNOSIS — M85.80 OTHER SPECIFIED DISORDERS OF BONE DENSITY AND STRUCTURE, UNSPECIFIED SITE: ICD-10-CM

## 2025-01-27 PROCEDURE — 99213 OFFICE O/P EST LOW 20 MIN: CPT

## 2025-01-27 RX ORDER — LANOLIN ALCOHOL/MO/W.PET/CERES
500 CREAM (GRAM) TOPICAL
Refills: 0 | Status: ACTIVE | COMMUNITY

## 2025-07-29 ENCOUNTER — NON-APPOINTMENT (OUTPATIENT)
Age: 80
End: 2025-07-29

## 2025-07-30 ENCOUNTER — APPOINTMENT (OUTPATIENT)
Dept: BREAST CENTER | Facility: CLINIC | Age: 80
End: 2025-07-30
Payer: MEDICARE

## 2025-07-30 VITALS
DIASTOLIC BLOOD PRESSURE: 64 MMHG | SYSTOLIC BLOOD PRESSURE: 95 MMHG | BODY MASS INDEX: 22.82 KG/M2 | HEART RATE: 73 BPM | WEIGHT: 124 LBS | HEIGHT: 62 IN

## 2025-07-30 DIAGNOSIS — Z80.3 FAMILY HISTORY OF MALIGNANT NEOPLASM OF BREAST: ICD-10-CM

## 2025-07-30 DIAGNOSIS — D24.1 BENIGN NEOPLASM OF RIGHT BREAST: ICD-10-CM

## 2025-07-30 DIAGNOSIS — R92.30 DENSE BREASTS, UNSPECIFIED: ICD-10-CM

## 2025-07-30 DIAGNOSIS — E05.90 THYROTOXICOSIS, UNSPECIFIED W/OUT THYROTOXIC CRISIS OR STORM: ICD-10-CM

## 2025-07-30 DIAGNOSIS — E04.1 NONTOXIC SINGLE THYROID NODULE: ICD-10-CM

## 2025-07-30 DIAGNOSIS — M85.80 OTHER SPECIFIED DISORDERS OF BONE DENSITY AND STRUCTURE, UNSPECIFIED SITE: ICD-10-CM

## 2025-07-30 DIAGNOSIS — N60.19 DIFFUSE CYSTIC MASTOPATHY OF UNSPECIFIED BREAST: ICD-10-CM

## 2025-07-30 DIAGNOSIS — Z86.0100 PERSONAL HISTORY OF COLON POLYPS, UNSPECIFIED: ICD-10-CM

## 2025-07-30 PROCEDURE — 99213 OFFICE O/P EST LOW 20 MIN: CPT

## 2025-09-16 ENCOUNTER — NON-APPOINTMENT (OUTPATIENT)
Age: 80
End: 2025-09-16